# Patient Record
Sex: FEMALE | Race: WHITE | NOT HISPANIC OR LATINO | Employment: UNEMPLOYED | URBAN - METROPOLITAN AREA
[De-identification: names, ages, dates, MRNs, and addresses within clinical notes are randomized per-mention and may not be internally consistent; named-entity substitution may affect disease eponyms.]

---

## 2019-08-20 NOTE — PROGRESS NOTES
[facility]  PATIENT:  Kristen Bee  MRN:  [id number]  :  1970  DATE OF SERVICE:  2019      Assessment/Plan:          Problem List Items Addressed This Visit        Cardiovascular and Mediastinum    Migraine with aura, with intractable migraine, so stated, with status migrainosus    Relevant Medications    rizatriptan (MAXALT) 10 MG tablet    Other Relevant Orders    Vitamin B12    MRI brain with and without contrast    Headache, chronic migraine without aura - Primary    Relevant Medications    cyproheptadine (PERIACTIN) 4 mg tablet    prochlorperazine (COMPAZINE) 10 mg tablet    rizatriptan (MAXALT) 10 MG tablet    magnesium oxide (MAG-OX) 400 mg    Riboflavin (VITAMIN B-2) 100 MG TABS    Other Relevant Orders    Vitamin B12    BUN    Creatinine, serum       Nervous and Auditory    Chronic tension headache    Relevant Medications    rizatriptan (MAXALT) 10 MG tablet       Other    Dizziness and giddiness    Relevant Orders    Ambulatory referral to Physical Therapy      Other Visit Diagnoses     Vitamin D deficiency        Relevant Orders    Vitamin D 25 hydroxy        Low vitamin-D:  [id number] taking vitamin-D 2000 international units on daily basis     Preventive therapy for headaches:   -Over-the-counter supplements: to decrease intensity and frequency of migraines  - Magnesium Oxide 400mg a day  If any diarrhea or upset stomach, decrease dose  as tolerated  - Vitamin B2 200 mg twice a day  May cause the urine to turn yellow which is normal for B 2 to do and is not a sign that you are dehydrated  - cyproheptadine 4 mg at bedtime daily  - did talk to patient about use of CBD  I told her that we do not recommended it, however if she wants to use it she would have to go to [facility] to look for physician who would prescribe it for her    Abortive therapy for headaches:   - at the onset of her migraine headache patient is to take Maxalt 10 mg and Compazine 10 mg  If this fails patient is to call for either Decadron or olanzapine    PLEASE DO NOT GIVE FIORICET TO PATIENT FOR HER HEADACHES OR ANY OPIOIDS    [place] Prescription Drug Monitoring Program report was reviewed and was appropriate   08/21/2019  1  07/25/2019  LORAZEPAM 1 MG TABLET  30 0  30  [facility]  [id number]  PENNS ([id number])  1   [place]    07/25/2019  1  07/25/2019  JMFNIS-[id number]-QAIJ -40  30 0  5  MO [facility]  [id number]  [place] ([id number])  0   [place]    07/25/2019  1  07/25/2019  LORAZEPAM 1 MG TABLET  30 0  30  [facility]  [id number]  [place] ([id number])  0   [place]    06/03/2019  1  06/03/2019  ACETAMINOPHEN-COD #3 TABLET  35 0  14  BR [facility]  [id number]  [place] ([id number])  0  11 25 MME  [place]    05/24/2019  1  05/22/2019  OXYCODONE-ACETAMINOPHEN 5-325  20 0  10  [facility]  [id number]  [place]NS ([id number])  0  15 0 MME  [place]    05/10/2019  1  05/10/2019  OXYCODONE-ACETAMINOPHEN 5-325  30 0  10  AR RIMA  [id number]  [place]ENNS ([id number])  0  22 5 MME  [place]    05/10/2019  1  05/09/2019  ALPRAZOLAM 0 25 MG TABLET  30 0  30  MO Y[facility]  [id number]  [place] ([id number])  0   [place]    05/07/2019  1  05/07/2019  KQVFQF-[id number]-SRCS -40  30 0  8  MO YUM[facility]  [id number]  [place] ([id number])  0   [place]    04/29/2019  1  04/29/2019  OXYCODONE-ACETAMINOPHEN 5-325  30 0  10  BR [facility]  [id number]  PENNS ([id number])  0  22 5 MME  [place]    04/15/2019  1  04/15/2019  OXYCODONE-ACETAMINOPHEN 5-325  30 0  10  [facility]  [id number]  PENNS ([id number])  0  22 5 MME  [place]    04/09/2019  1  04/09/2019  ALPRAZOLAM 0 25 MG TABLET  30 0  30  MO YUMIKO  [id number]  [place]ENNS ([id number])  0   [place]    04/05/2019  1  04/05/2019  OXYCODONE-ACETAMINOPHEN 5-325  30 0  7  AR [facility]IMA  [id number]  PENNS ([id number])  0  32 14 MME  [place]    03/26/2019  1  03/26/2019  OXYCODONE-ACETAMINOPHEN 5-325  35 0  8  [facility]  [id number]  PENNS ([id number])  0  32 81 MME  [place]    03/14/2019  1  03/14/2019  OXYCODONE-ACETAMINOPHEN 5-325  40 0  10  [facility]  [id number]  PENNS ([id number])  0  30 0 MME  [place]    03/07/2019  1  03/07/2019  OXYCODONE-ACETAMINOPHEN 5-325  40 0  7  [facility]  [id number]  [place]ENNS ([id number])  0  42 86 MME  [place]    Filled  ID  Written  Drug  QTY  Days  Prescriber  Rx #  Pharmacy *  Refills  Daily Dose  Pymt Type     *Pharmacy is created using a combination of pharmacy name and the last four digits of the pharmacy license number  Work up:   -  MRI of the head with and without contrast      Headache management instructions  - When patient has a moderate to severe headache, they should seek rest, initiate relaxation and apply cold compresses to the head  - Maintain regular sleep schedule  Adults need at least 7-8 hours of uninterrupted a night  - Limit over the counter medications such as Tylenol, Ibuprofen, Aleve, Excedrin  (No more than 2- 3 times a week or max 10 a month)  - Maintain headache diary  Free BENJIE for a smart phone, which can be used is "Migraine jones"  - Limit caffeine to 1-2 cups 8 to 16 oz a day or less  - Avoid dietary trigger  (aged cheese, peanuts, MSG, aspartame and nitrates)  - Patient is to have regular frequent meals to prevent headache onset  - Please drink at least 64 ounces of water a day to help remain hydrated  Medication overuse headaches:   - Medication overuse headache [facility]) and analgesic overuse can negate the effectiveness of headache preventive measures  Avoid medications with narcotics, barbiturates, or caffeine in them as these can cause rebound headaches after very few doses and can interfere with other headache medicine efficacy  Taking  any acute/abortive over the counter medication or prescription drugs for more than 2-3 days a week can cause medication overuse headache  Importance of Healthy Sleep:  Behavioral sleep changes can promote restful, regular sleep and reduce headache   Simple changes like establishing consistent sleep and wake-up times, as well as getting between 7 and 8 hours of sleep a day, can make a world of difference  Experts also recommend avoiding substances that impair sleep, like caffeine, nicotine and alcohol, and also suggest winding down before bed to prevent sleep problems  To read more go to https://[employer]  org/resource-library/sleep/    Exercising for migraineurs:  Regular exercise can reduce the frequency and intensity of headaches and migraines  When one exercises, the body releases endorphins, which are the bodys natural painkillers  Exercise reduces stress and helps individuals to sleep at night  Exercising at least 30 to 40 minutes 3 times a week is sufficient for most patients  When exercising, follow this plan to prevent headaches:  - First, stay hydrated before, during, and after exercise  - Second part of the exercise plan is to eat sufficient food about an hour and a half before you exercise  Exercise causes ones blood sugar level to decrease, and it is important to have a source of energy    - Final part of the exercise plan is to warm-up  Do not jump into sudden, vigorous exercise if that triggers a headache or migraine  To read more go to https://[employer]  org/resource-library/effects-of-exercise-on-headaches-and-migraines/     Please call with any questions or concerns  Office number is [phone]          History of Present Illness: We had the pleasure of evaluating Stu Leija in neurological consultation today for headaches  As you know,  she is a 51 y o  right handed  female  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 none  Any family history of aneurysms? none    Have you seen someone else for headaches or pain? PCP    Headaches started at what age? In her teen    What is your current pain level? 4/10    How often do the headaches occur? Mild headaches: daily  Moderate to severe headaches: one a day she gets a severe headache    Are you ever headache free? no    Aura/Warning and how long does it last? Yes, she has spots in her vision that last for 20 minutes and tends to occur 1-2 times every few month    What time of the day do the headaches start? Mild headaches: there all the time  Moderate to severe headaches: anytime of the day    How long do the headaches last?   Mild headaches: There all the time  Moderate to severe headaches: few hours to few days    Describe your usual headache? Mild headaches: pressure, achy  Moderate to severe headaches: throbbing, , shooting, stabbing    Where is your headache located? Mild headaches: occipital region and radiates to the frontal region  Moderate to severe headaches: temporal region and frontal at time    What is the intensity of pain?    Mild headaches: 3-4/10  Moderate to severe headaches: varies from 7 to 10/10    Associated symptoms:   - Nausea      Vomiting        Diarrhea  - Photophobia     Phonophobia      Osmophobia  - Insomnia  - Lacrimation  Nasal congestion/rhinorrhea   Pale face  - Stiff or sore neck   - Dizziness   light headed  - Problems with concentration  - Tinnitus   - Worse with lying down   better with lying down  - Prefer to be in a cool, quiet, dark room    Number of days missed per month because of headaches:  Work (or school) days:   Social or Family activities: 80% of them    Headache are worse if the patient: cough  Headache triggers:  Stress, coughing, weather changes, sunlight fatigue  What time of the year do headaches occur more frequently? Usually worse with seasonal change    Have you had trigger point injection performed and how often? No  Have you had Botox injection performed and how often? No   Have you had epidural injections or transforaminal injections performed? No    Alternative therapies used in the past for headaches? None  Have you used CBD or THC for your headaches and how often? No  How many caffeine products to drink a day? One cup a day or soda twice a day  How much water to drink a day? 5 water bottles    Are you current pregnant or planning on getting pregnant? No    What medications do you take or have you taken for your headaches? Preventive therapy:   - vitamin-D, B12,  - Ativan 1 mg, Wellbutrin 300 mg daily, hydroxyzine seen 25 mg, Xanax 0 25 mg daily, lorazepam 1 mg daily, Zoloft 50 mg daily  - cyproheptadine  Abortive Therapy:   - Fioricet 4 times a day 30 tabs, Percocet 1 tab daily,  - Benadryl 25 mg,  - Toradol 10 mg p r n ,           Mood:   Depression: Yes  Anxiety: Yes    Have you ever had any Brain imaging? Yes  - Reviewed old notes from physician seen in the past  - Reviewed images with the patient on Portal   Recent laboratory data was reviewed  Medications and allergies were reviewed  CT head without contrast:  May 2017:  Impression:  1  No evidence of acute intracranial hemorrhage, mass lesion, or CT evidence of acute infarct  2  Trace left mastoid fluid  History reviewed  No pertinent past medical history      Patient Active Problem List   Diagnosis    Migraine with aura, with intractable migraine, so stated, with status migrainosus    Dizziness and giddiness    Headache, chronic migraine without aura    Chronic tension headache       Medications:      Current Outpatient Medications   Medication Sig Dispense Refill    albuterol (PROVENTIL HFA,VENTOLIN HFA) 90 mcg/act inhaler Inhale 2 puffs every 6 (six) hours as needed      buPROPion (WELLBUTRIN XL) 300 mg 24 hr tablet Take 300 mg by mouth      ergocalciferol (VITAMIN D2) 50,000 units       hydrOXYzine HCL (ATARAX) 25 mg tablet Take 25 mg by mouth every 8 (eight) hours as needed      LORazepam (ATIVAN) 1 mg tablet 1 tab daily as needed      montelukast (SINGULAIR) 10 mg tablet Take 10 mg by mouth      omeprazole (PriLOSEC) 40 MG capsule Take 40 mg by mouth daily      cyproheptadine (PERIACTIN) 4 mg tablet 1 at bedtime daily 30 tablet 3    magnesium oxide (MAG-OX) 400 mg Take 1 tablet (400 mg total) by mouth 2 (two) times a day 60 tablet 3    prochlorperazine (COMPAZINE) 10 mg tablet 1 tab at onset of migraine, can repeat in 8 hours, can take with triptan/NSAID 20 tablet 3    Riboflavin (VITAMIN B-2) 100 MG TABS 2 in the morning and 2 at bedtime 120 each 6    rizatriptan (MAXALT) 10 MG tablet Take 1 tablet (10 mg total) by mouth once as needed for migraine May repeat every 2 hours if needed, max 30mg/24 hours 12 tablet 3     No current facility-administered medications for this visit  Allergies: Allergies   Allergen Reactions    Other Swelling and Sneezing     Seasonal    Penicillins Hives       Family History:     History reviewed  No pertinent family history      Social History:     Social History     Socioeconomic History    Marital status:      Spouse name: Not on file    Number of children: Not on file    Years of education: Not on file    Highest education level: Not on file   Occupational History    Not on file   Social Needs    Financial resource strain: Not on file    Food insecurity:     Worry: Not on file     Inability: Not on file    Transportation needs:     Medical: Not on file     Non-medical: Not on file   Tobacco Use    Smoking status: Current Every Day Smoker     Packs/day: 1 00     Types: Cigarettes    Smokeless tobacco: Never Used Substance and Sexual Activity    Alcohol use: Yes     Frequency: Monthly or less     Drinks per session: 1 or 2     Binge frequency: Less than monthly    Drug use: Not Currently    Sexual activity: Not on file   Lifestyle    Physical activity:     Days per week: Not on file     Minutes per session: Not on file    Stress: Not on file   Relationships    Social connections:     Talks on phone: Not on file     Gets together: Not on file     Attends [employer] service: Not on file     Active member of club or organization: Not on file     Attends meetings of clubs or organizations: Not on file     Relationship status: Not on file    Intimate partner violence:     Fear of current or ex partner: Not on file     Emotionally abused: Not on file     Physically abused: Not on file     Forced sexual activity: Not on file   Other Topics Concern    Not on file   Social History Narrative    Not on file         Objective:   Physical Exam:                                                                   Vitals:               /82 (BP Location: Right arm, Patient Position: Sitting, Cuff Size: Adult)   Pulse (!) 110   Resp 16   Ht 5' 5" (1 651 m)   Wt 79 2 kg (174 lb 11 2 oz)   BMI 29 07 kg/m²   BP Readings from Last 3 Encounters:   08/22/19 112/82     Pulse Readings from Last 3 Encounters:   08/22/19 (!) 110                  CONSTITUTIONAL: Strong order of cigarettes  Eyes:  PERRLA, EOM normal      Neck:  Normal ROM, neck supple  HEENT:  Normocephalic atraumatic  No meningismus  Oropharynx is clear and moist  No oral mucosal lesions  Chest:  Respirations regular and unlabored  Cardiovascular:  Distal extremities warm without palpable edema or tenderness, no observed significant swelling  Musculoskeletal:  Full range of motion      Skin:  warm and dry   Psychiatric:  Normal behavior and appropriate affect        Neurological Examination:   Mental status/cognitive function: Orientated to time, place and person  Cranial Nerves: 2 to 12 intact  Motor Exam:    5/5 upper extremity  5/5 lower extremity-limited exam in the right lower extremity  Sensory: grossly intact light touch in all extremities  Reflexes:   2/4 upper extremity  2/4/4 lower extremity, did not checked right lower knee and ankle due to brace  No clonus noted  Coordination:  Past-pointing bilaterally  Gait:  Wearing a brace in the right knee does limping when walking         Review of Systems:   Review of Systems  Review of Systems   Constitutional: Positive for appetite change and fatigue  Negative for fever  HENT: Positive for tinnitus and trouble swallowing  Negative for hearing loss and voice change  Eyes: Positive for visual disturbance (Blurry vision)  Negative for photophobia and pain  Respiratory: Negative  Negative for shortness of breath  Cardiovascular: Negative  Negative for palpitations  Gastrointestinal: Positive for nausea and vomiting  Endocrine: Negative  Negative for cold intolerance and heat intolerance  Hot flashes   Genitourinary: Negative  Negative for dysuria, frequency and urgency  Musculoskeletal: Positive for gait problem (Balance issues), myalgias, neck pain and neck stiffness  Skin: Negative  Negative for rash  Allergic/Immunologic: Negative  Neurological: Positive for dizziness, light-headedness and headaches  Negative for tremors, seizures, syncope, facial asymmetry, speech difficulty, weakness and numbness  Focus issues and forgetfulness   Hematological: Negative  Does not bruise/bleed easily  Psychiatric/Behavioral: Positive for sleep disturbance (difficulty sleeping, cant stay asleep)  Negative for confusion and hallucinations       I personally reviewed and updated the ROS that was entered by the medical assistant       I have spent 60 minutes with Patient  today in which greater than 50% of this time was spent in counseling/coordination of care regarding Prognosis, Risks and benefits of tx options, Intructions for management, Patient and family education, Importance of tx compliance, Risk factor reductions and Impressions        Author:  Kaylyn Agee MD 8/22/2019 2:50 PM

## 2019-08-22 ENCOUNTER — CONSULT (OUTPATIENT)
Dept: NEUROLOGY | Facility: CLINIC | Age: 49
End: 2019-08-22
Payer: COMMERCIAL

## 2019-08-22 ENCOUNTER — APPOINTMENT (OUTPATIENT)
Dept: LAB | Facility: CLINIC | Age: 49
End: 2019-08-22
Payer: COMMERCIAL

## 2019-08-22 ENCOUNTER — TRANSCRIBE ORDERS (OUTPATIENT)
Dept: NEUROLOGY | Facility: CLINIC | Age: 49
End: 2019-08-22

## 2019-08-22 VITALS
BODY MASS INDEX: 29.11 KG/M2 | HEART RATE: 110 BPM | DIASTOLIC BLOOD PRESSURE: 82 MMHG | WEIGHT: 174.7 LBS | HEIGHT: 65 IN | RESPIRATION RATE: 16 BRPM | SYSTOLIC BLOOD PRESSURE: 112 MMHG

## 2019-08-22 DIAGNOSIS — G43.701 CHRONIC MIGRAINE WITHOUT AURA WITH STATUS MIGRAINOSUS, NOT INTRACTABLE: ICD-10-CM

## 2019-08-22 DIAGNOSIS — G43.701 CHRONIC MIGRAINE WITHOUT AURA WITH STATUS MIGRAINOSUS, NOT INTRACTABLE: Primary | ICD-10-CM

## 2019-08-22 DIAGNOSIS — E55.9 VITAMIN D DEFICIENCY: ICD-10-CM

## 2019-08-22 DIAGNOSIS — R42 DIZZINESS AND GIDDINESS: ICD-10-CM

## 2019-08-22 DIAGNOSIS — G43.111 MIGRAINE WITH AURA, WITH INTRACTABLE MIGRAINE, SO STATED, WITH STATUS MIGRAINOSUS: ICD-10-CM

## 2019-08-22 DIAGNOSIS — G44.221 CHRONIC TENSION-TYPE HEADACHE, INTRACTABLE: ICD-10-CM

## 2019-08-22 DIAGNOSIS — G43.111 MIGRAINE WITH AURA, WITH INTRACTABLE MIGRAINE, SO STATED, WITH STATUS MIGRAINOSUS: Primary | ICD-10-CM

## 2019-08-22 PROBLEM — G44.229 CHRONIC TENSION HEADACHE: Status: ACTIVE | Noted: 2019-08-22

## 2019-08-22 PROBLEM — G43.709 HEADACHE, CHRONIC MIGRAINE WITHOUT AURA: Status: ACTIVE | Noted: 2019-08-22

## 2019-08-22 LAB
25(OH)D3 SERPL-MCNC: 15.4 NG/ML (ref 30–100)
BUN SERPL-MCNC: 17 MG/DL (ref 5–25)
CREAT SERPL-MCNC: 0.91 MG/DL (ref 0.6–1.3)
GFR SERPL CREATININE-BSD FRML MDRD: 75 ML/MIN/1.73SQ M
VIT B12 SERPL-MCNC: 518 PG/ML (ref 100–900)

## 2019-08-22 PROCEDURE — 99245 OFF/OP CONSLTJ NEW/EST HI 55: CPT | Performed by: PSYCHIATRY & NEUROLOGY

## 2019-08-22 PROCEDURE — 82306 VITAMIN D 25 HYDROXY: CPT

## 2019-08-22 PROCEDURE — 82607 VITAMIN B-12: CPT

## 2019-08-22 PROCEDURE — 84520 ASSAY OF UREA NITROGEN: CPT

## 2019-08-22 PROCEDURE — 82565 ASSAY OF CREATININE: CPT

## 2019-08-22 PROCEDURE — 36415 COLL VENOUS BLD VENIPUNCTURE: CPT

## 2019-08-22 RX ORDER — HYDROXYZINE HYDROCHLORIDE 25 MG/1
25 TABLET, FILM COATED ORAL EVERY 8 HOURS PRN
COMMUNITY
Start: 2019-06-03

## 2019-08-22 RX ORDER — CYPROHEPTADINE HYDROCHLORIDE 4 MG/1
TABLET ORAL
Qty: 30 TABLET | Refills: 3 | Status: SHIPPED | OUTPATIENT
Start: 2019-08-22 | End: 2021-06-15 | Stop reason: SDDI

## 2019-08-22 RX ORDER — LORAZEPAM 1 MG/1
TABLET ORAL 2 TIMES DAILY
COMMUNITY
Start: 2019-07-25

## 2019-08-22 RX ORDER — ERGOCALCIFEROL 1.25 MG/1
50000 CAPSULE ORAL WEEKLY
COMMUNITY
Start: 2019-08-21

## 2019-08-22 RX ORDER — RIZATRIPTAN BENZOATE 10 MG/1
10 TABLET ORAL ONCE AS NEEDED
Qty: 12 TABLET | Refills: 3 | Status: SHIPPED | OUTPATIENT
Start: 2019-08-22 | End: 2021-09-09 | Stop reason: SDUPTHER

## 2019-08-22 RX ORDER — BUTALBITAL, ACETAMINOPHEN AND CAFFEINE 50; 325; 40 MG/1; MG/1; MG/1
TABLET ORAL
COMMUNITY
Start: 2019-07-25 | End: 2019-08-22 | Stop reason: ALTCHOICE

## 2019-08-22 RX ORDER — OMEPRAZOLE 40 MG/1
40 CAPSULE, DELAYED RELEASE ORAL DAILY
COMMUNITY
Start: 2019-07-25 | End: 2020-07-24

## 2019-08-22 RX ORDER — ALBUTEROL SULFATE 90 UG/1
2 AEROSOL, METERED RESPIRATORY (INHALATION) EVERY 6 HOURS PRN
COMMUNITY
Start: 2019-06-06 | End: 2020-06-05

## 2019-08-22 RX ORDER — MONTELUKAST SODIUM 10 MG/1
10-20 TABLET ORAL DAILY
COMMUNITY
Start: 2019-05-07 | End: 2022-06-24

## 2019-08-22 RX ORDER — PROCHLORPERAZINE MALEATE 10 MG
TABLET ORAL
Qty: 20 TABLET | Refills: 3 | Status: SHIPPED | OUTPATIENT
Start: 2019-08-22 | End: 2022-06-24 | Stop reason: SDUPTHER

## 2019-08-22 RX ORDER — BUPROPION HYDROCHLORIDE 300 MG/1
300 TABLET ORAL DAILY
COMMUNITY
Start: 2019-07-25 | End: 2022-06-24

## 2019-08-22 NOTE — PATIENT INSTRUCTIONS
Low vitamin-D:  04/09/2000 19-9-     Preventive therapy for headaches:   -Over-the-counter supplements: to decrease intensity and frequency of migraines  - Magnesium Oxide 400mg a day  If any diarrhea or upset stomach, decrease dose  as tolerated  - Vitamin B2 200 mg twice a day  May cause the urine to turn yellow which is normal for B 2 to do and is not a sign that you are dehydrated  - cyproheptadine 4 mg at bedtime daily  Abortive therapy for headaches:   - at the onset of her migraine headache patient is to take Maxalt 10 mg and Compazine 10 mg  If this fails patient is to call for either Decadron or olanzapine  [place] Prescription Drug Monitoring Program report was reviewed and was appropriate     Work up:   -  MRI of the head with and without contrast      Headache management instructions  - When patient has a moderate to severe headache, they should seek rest, initiate relaxation and apply cold compresses to the head  - Maintain regular sleep schedule  Adults need at least 7-8 hours of uninterrupted a night  - Limit over the counter medications such as Tylenol, Ibuprofen, Aleve, Excedrin  (No more than 2- 3 times a week or max 10 a month)  - Maintain headache diary  Free BENJIE for a smart phone, which can be used is "Migraine jones"  - Limit caffeine to 1-2 cups 8 to 16 oz a day or less  - Avoid dietary trigger  (aged cheese, peanuts, MSG, aspartame and nitrates)  - Patient is to have regular frequent meals to prevent headache onset  - Please drink at least 64 ounces of water a day to help remain hydrated  Medication overuse headaches:   - Medication overuse headache [facility]) and analgesic overuse can negate the effectiveness of headache preventive measures  Avoid medications with narcotics, barbiturates, or caffeine in them as these can cause rebound headaches after very few doses and can interfere with other headache medicine efficacy   Taking  any acute/abortive over the counter medication or prescription drugs for more than 2-3 days a week can cause medication overuse headache  Importance of Healthy Sleep:  Behavioral sleep changes can promote restful, regular sleep and reduce headache  Simple changes like establishing consistent sleep and wake-up times, as well as getting between 7 and 8 hours of sleep a day, can make a world of difference  Experts also recommend avoiding substances that impair sleep, like caffeine, nicotine and alcohol, and also suggest winding down before bed to prevent sleep problems  To read more go to https://[employer]  org/resource-library/sleep/    Exercising for migraineurs:  Regular exercise can reduce the frequency and intensity of headaches and migraines  When one exercises, the body releases endorphins, which are the bodys natural painkillers  Exercise reduces stress and helps individuals to sleep at night  Exercising at least 30 to 40 minutes 3 times a week is sufficient for most patients  When exercising, follow this plan to prevent headaches:  - First, stay hydrated before, during, and after exercise  - Second part of the exercise plan is to eat sufficient food about an hour and a half before you exercise  Exercise causes ones blood sugar level to decrease, and it is important to have a source of energy    - Final part of the exercise plan is to warm-up  Do not jump into sudden, vigorous exercise if that triggers a headache or migraine  To read more go to https://[employer]  org/resource-library/effects-of-exercise-on-headaches-and-migraines/     Please call with any questions or concerns   Office number is [phone]

## 2019-08-22 NOTE — PROGRESS NOTES
Review of Systems   Constitutional: Positive for appetite change and fatigue  Negative for fever  HENT: Positive for tinnitus and trouble swallowing  Negative for hearing loss and voice change  Eyes: Positive for visual disturbance (Blurry vision)  Negative for photophobia and pain  Respiratory: Negative  Negative for shortness of breath  Cardiovascular: Negative  Negative for palpitations  Gastrointestinal: Positive for nausea and vomiting  Endocrine: Negative  Negative for cold intolerance and heat intolerance  Hot flashes   Genitourinary: Negative  Negative for dysuria, frequency and urgency  Musculoskeletal: Positive for gait problem (Balance issues), myalgias, neck pain and neck stiffness  Skin: Negative  Negative for rash  Allergic/Immunologic: Negative  Neurological: Positive for dizziness, light-headedness and headaches  Negative for tremors, seizures, syncope, facial asymmetry, speech difficulty, weakness and numbness  Focus issues and forgetfulness   Hematological: Negative  Does not bruise/bleed easily  Psychiatric/Behavioral: Positive for sleep disturbance (difficulty sleeping, cant stay asleep)  Negative for confusion and hallucinations

## 2019-08-27 ENCOUNTER — EVALUATION (OUTPATIENT)
Dept: PHYSICAL THERAPY | Facility: CLINIC | Age: 49
End: 2019-08-27
Payer: COMMERCIAL

## 2019-08-27 DIAGNOSIS — R42 DIZZINESS AND GIDDINESS: Primary | ICD-10-CM

## 2019-08-27 PROCEDURE — 97162 PT EVAL MOD COMPLEX 30 MIN: CPT | Performed by: PHYSICAL THERAPIST

## 2019-08-30 ENCOUNTER — OFFICE VISIT (OUTPATIENT)
Dept: PHYSICAL THERAPY | Facility: CLINIC | Age: 49
End: 2019-08-30
Payer: COMMERCIAL

## 2019-08-30 DIAGNOSIS — R42 DIZZINESS AND GIDDINESS: Primary | ICD-10-CM

## 2019-08-30 PROCEDURE — 97110 THERAPEUTIC EXERCISES: CPT | Performed by: PHYSICAL THERAPIST

## 2019-08-30 PROCEDURE — 97112 NEUROMUSCULAR REEDUCATION: CPT | Performed by: PHYSICAL THERAPIST

## 2019-08-30 NOTE — PROGRESS NOTES
Daily Note     Today's date: 2019  Patient name: Stu Leija  : 1970  MRN: [id number]  Referring provider: Maninder Huddleston MD  Dx:   Encounter Diagnosis     ICD-10-CM    1  Dizziness and giddiness R42                   Subjective: Pt has 8/10 HA today but no dizziness  Had hard time getting stuff done due to migraine today  Objective: See treatment diary below      Assessment: Initiated POC which pt tolerated well  Reviewed HEP which pt reported understanding  Pt became tearful during session due to being concerned about her condition since she has to get an MRI  Most difficulty with smooth pursuits and pt had some c/o dizziness  Overall very minimal c/o dizziness this session  Patient would benefit from continued PT      Plan: Progress treatment as tolerated         Short Term Goal Expiration Date:(19)  Long Term Goal Expiration Date: (10/14/19)  POC Expiration Date: (10/14/19)        Precautions NA        Manual                                                                                          Exercise Diary              UT S 30" x 3           LS S 30" x 2           VOR x 1  45" x 2 standing plain            Ambulation with HT/HN   2 laps ea fw/bw           Tandem stance             Tandem gait   2 laps            Ambulation with turns              Smooth pursuits   CW/CCW 45" x 2                                                                                                                                                                                         Modalities

## 2019-09-03 ENCOUNTER — OFFICE VISIT (OUTPATIENT)
Dept: PHYSICAL THERAPY | Facility: CLINIC | Age: 49
End: 2019-09-03
Payer: COMMERCIAL

## 2019-09-03 DIAGNOSIS — R42 DIZZINESS AND GIDDINESS: Primary | ICD-10-CM

## 2019-09-03 PROCEDURE — 97112 NEUROMUSCULAR REEDUCATION: CPT | Performed by: PHYSICAL THERAPIST

## 2019-09-03 PROCEDURE — 97150 GROUP THERAPEUTIC PROCEDURES: CPT | Performed by: PHYSICAL THERAPIST

## 2019-09-05 ENCOUNTER — OFFICE VISIT (OUTPATIENT)
Dept: PHYSICAL THERAPY | Facility: CLINIC | Age: 49
End: 2019-09-05
Payer: COMMERCIAL

## 2019-09-05 DIAGNOSIS — R42 DIZZINESS AND GIDDINESS: Primary | ICD-10-CM

## 2019-09-05 PROCEDURE — 97112 NEUROMUSCULAR REEDUCATION: CPT | Performed by: PHYSICAL THERAPIST

## 2019-09-05 NOTE — PROGRESS NOTES
Daily Note     Today's date: 2019  Patient name: Stu Leija  : 1970  MRN: [id number]  Referring provider: Maninder Huddleston MD  Dx:   Encounter Diagnosis     ICD-10-CM    1  Dizziness and giddiness R42                   Subjective: No complaints upon arrival   No dizziness upon arrival   Reports some compliance with HEP  Objective: See treatment diary below    Assessment: Added VORcx and foam activities today with pt having difficulty due to increasing dizziness and decreased balance  Able to complete with occasional UE support for foam activities  Difficulty with tandem gait in valdes with arms crossed requiring stepping strategies to correct LOBs, however with increased reps improved  Plan: Continue with additions from today progressing potentially to busy background NV as pt able       Short Term Goal Expiration Date:(19)  Long Term Goal Expiration Date: (10/14/19)  POC Expiration Date: (10/14/19)        Precautions NA        Manual                                                                                          Exercise Diary   09/3  9/5       UT S 30" x 3  30"x2  HEP       LS S 30" x 2  30"x2  HEP       VOR x 1  45" x 2 standing plain   standing plain   H45"  V 45" standing plain   H45" x2  V 45" x2       Ambulation with HT/HN   2 laps ea fw/bw     2 laps ea fw/bw  2 laps ea fw/bw       Tandem stance      semitandem foam EC 4x30s       Tandem gait   2 laps   2 laps   in valdes  EO  2x40'       Ambulation with turns     1/2 turns 40ft ea   360  2x40'       Smooth pursuits   CW/CCW 45" x 2   CW/CCW 45" x 2  CW/CCW 45" x 2       VORcx, H/V standing     Plain  2x30s ea       Foam HTs, H/V EC     2x30s ea                                                                                                                                                         Modalities

## 2019-09-06 ENCOUNTER — TELEPHONE (OUTPATIENT)
Dept: NEUROLOGY | Facility: CLINIC | Age: 49
End: 2019-09-06

## 2019-09-06 NOTE — TELEPHONE ENCOUNTER
Pt called in stated that her MRI was denied because she needs documentation that her medications were not working, asking for an appt  Per Thor Child this is correct and she stated that Frieda An could not addend the note  Please advise when you would like to see pt back  Pt wants the MRI

## 2019-09-06 NOTE — TELEPHONE ENCOUNTER
This was the 1st time I saw her  I do not need to see her any sooner than when her appointment is with myself or Bety    Thank you

## 2019-09-09 ENCOUNTER — OFFICE VISIT (OUTPATIENT)
Dept: PHYSICAL THERAPY | Facility: CLINIC | Age: 49
End: 2019-09-09
Payer: COMMERCIAL

## 2019-09-09 DIAGNOSIS — R42 DIZZINESS AND GIDDINESS: Primary | ICD-10-CM

## 2019-09-09 PROCEDURE — 97112 NEUROMUSCULAR REEDUCATION: CPT | Performed by: PHYSICAL THERAPIST

## 2019-09-09 NOTE — PROGRESS NOTES
Daily Note     Today's date: 2019  Patient name: Kelli Perdomo  : 1970  MRN: [id number]  Referring provider: Elia Hi MD  Dx:   Encounter Diagnosis     ICD-10-CM    1  Dizziness and giddiness R42                   Subjective: States that migraines are always worse in the fall every year  MRI was denied  Feels like she is having less energy  Objective: See treatment diary below      Assessment: Tolerated new progressions well, continues to be very challenged with bw ambulation with HT and HN  Pt also had c/o increased HA when pt performed exercises EC  Patient would benefit from continued PT      Plan: Progress treatment as tolerated         Short Term Goal Expiration Date:(19)  Long Term Goal Expiration Date: (10/14/19)  POC Expiration Date: (10/14/19)        Precautions NA        Manual                                                                                          Exercise Diary   8/30  09/3  9/5  9/9     UT S 30" x 3  30"x2  HEP  HEP     LS S 30" x 2  30"x2  HEP  HEP     VOR x 1  45" x 2 standing plain   standing plain   H45"  V 45" standing plain   H45" x2  V 45" x2 standing busy H 45" x 2  V 45" x 2     Ambulation with HT/HN   2 laps ea fw/bw     2 laps ea fw/bw  2 laps ea fw/bw  2 laps ea fw/bw     Tandem stance      semitandem foam EC 4x30s semitandem foam EC 4x30s     Tandem gait   2 laps   2 laps   in valdes  EO  2x40' in valdes  EO  2x40'     Ambulation with turns     1/2 turns 40ft ea   360  2x40' 360  2x40'     Smooth pursuits   CW/CCW 45" x 2   CW/CCW 45" x 2  CW/CCW 45" x 2 CW/CCW, busy 45" x 2     VORcx, H/V standing     Plain  2x30s ea standing busy H 45" x 2  V 45" x 2     Foam HTs, H/V EC     2x30s ea  2x30s ea     Rockerboard         taps EO 20x A/P, M/L                                                                                                                                         Modalities

## 2019-09-10 NOTE — TELEPHONE ENCOUNTER
Unfortunately, need to give time for any medications to work    Therefore needs to be in 2 months after initial visit

## 2019-09-10 NOTE — TELEPHONE ENCOUNTER
Pt called and advised of the below  Pt states that all her migraine meds are not helping  Her insurance is requesting documentation that her meds were not working  States that she really would like to get MRI done asap bec she is planning to quit her job

## 2019-09-12 ENCOUNTER — OFFICE VISIT (OUTPATIENT)
Dept: PHYSICAL THERAPY | Facility: CLINIC | Age: 49
End: 2019-09-12
Payer: COMMERCIAL

## 2019-09-12 DIAGNOSIS — R42 DIZZINESS AND GIDDINESS: Primary | ICD-10-CM

## 2019-09-12 PROCEDURE — 97112 NEUROMUSCULAR REEDUCATION: CPT | Performed by: PHYSICAL THERAPIST

## 2019-09-12 NOTE — PROGRESS NOTES
Daily Note     Today's date: 2019  Patient name: Yolanda Lind  : 1970  MRN: [id number]  Referring provider: Vinicio Currie MD  Dx:   Encounter Diagnosis     ICD-10-CM    1  Dizziness and giddiness R42                   Subjective: Has a migraine which just started  Feels like PT is helping because she feels a little better after doing a few reps of the exercises  States that neurologist does not want to do MRI yet and to trial meds for a few months first  Thinks migraines are worse when it is going to rain and it is suppose to rain today  Objective: See treatment diary below      Assessment: Tolerated treatment well, reported no change in HA while on bike  Pt continues to demo significant instability during ambulation with HT bw and tandem gait  Patient would benefit from continued PT      Plan: Progress treatment as tolerated         Short Term Goal Expiration Date:(19)  Long Term Goal Expiration Date: (10/14/19)  POC Expiration Date: (10/14/19)        Precautions NA        Manual                                                                                          Exercise Diary   8/30  09/3  9/5  9/9  9/12   UT S 30" x 3  30"x2  HEP  HEP     LS S 30" x 2  30"x2  HEP  HEP     VOR x 1  45" x 2 standing plain   standing plain   H45"  V 45" standing plain   H45" x2  V 45" x2 standing busy H 45" x 2  V 45" x 2 standing busy H 45" x 2  V 45" x 2   Ambulation with HT/HN   2 laps ea fw/bw     2 laps ea fw/bw  2 laps ea fw/bw  2 laps ea fw/bw   2 laps ea fw/bw   Tandem stance      semitandem foam EC 4x30s semitandem foam EC 4x30s semitandem foam EC 4x30s   Tandem gait   2 laps   2 laps   in valdes  EO  2x40' in valdes  EO  2x40'  in valdes  EO  2x40'   Ambulation with turns     1/2 turns 40ft ea   360  2x40' 360  2x40' 360  2x40'   Smooth pursuits   CW/CCW 45" x 2   CW/CCW 45" x 2  CW/CCW 45" x 2 CW/CCW, busy 45" x 2     VORcx, H/V standing     Plain  2x30s ea standing busy H 45" x 2  V 45" x 2  standing busy H 45" x 1  V 45" x 1   Foam HTs, H/V EC     2x30s ea  2x30s ea   2x30s ea   Rockerboard         taps EO 20x A/P, M/L taps EO 20x A/P, M/L    Upright bike         L3, 5 min                                                                                                                          Modalities

## 2019-09-16 ENCOUNTER — APPOINTMENT (OUTPATIENT)
Dept: PHYSICAL THERAPY | Facility: CLINIC | Age: 49
End: 2019-09-16
Payer: COMMERCIAL

## 2019-09-19 ENCOUNTER — OFFICE VISIT (OUTPATIENT)
Dept: PHYSICAL THERAPY | Facility: CLINIC | Age: 49
End: 2019-09-19
Payer: COMMERCIAL

## 2019-09-19 DIAGNOSIS — R42 DIZZINESS AND GIDDINESS: Primary | ICD-10-CM

## 2019-09-19 PROCEDURE — 97112 NEUROMUSCULAR REEDUCATION: CPT | Performed by: PHYSICAL THERAPIST

## 2019-09-19 NOTE — PROGRESS NOTES
Progress Note     Today's date: 2019  Patient name: Joseph Fletcher  : 1970  MRN: [id number]  Referring provider: Alicja Jacobs MD  Dx:   Encounter Diagnosis     ICD-10-CM    1  Dizziness and giddiness R42                   Subjective: Still getting migraines but overall better  Dizziness is in the morning  No dizziness or migraine now  Overall does not think migraines are as severe before  Migraines are usually worse with changes in the weather  Dizziness is less throughout the day but the worst in the morning  Denies any neck pain and has 0/10 HA  Pt just accepted a job and will be starting next week  Objective: See treatment diary below      Assessment: Progress update performed this session  Compared to IE, pt reported significant improvement in dizziness per DHI and overall subjective report of improved sxs  Pt also demo improved cervical AROM and static balance per MCTSIB  No significant improvement in dynamic balance per FGA and continues to have instability with tandem gait, ambulation with HT/HN, and BW ambulation  Starting next week, pt will be starting a new job and will not be able to attend PT consistently but pt is independent with HEP and has met most of her goals so pt will be placed on 30 day hold at this time, recommended pt to return to PT if sxs worsens  Performed and reviewed updated HEP this session  Plan: 30 day hold   Planned therapy interventions: balance, neuromuscular re-education, stretching, home exercise program, patient education and postural training      Goals  ST weeks  1  Independent with HEP - MET  2  Improve cervical AROM in all directions 5-10 deg bilaterally - MET    LT-6 weeks  1  FGA > 27/30 - NOT MET  2  MCTSIB = 30 sec in all conditions - MET  3   DHI < 50 - MET       Cervical     Flexion: 45, prev = 40 degrees   Extension: 50, prev = 40 degrees      Left lateral flexion: 42, prev = 32 degrees      Right lateral flexion: 40, prev = 35 degrees      Left rotation: 65, prev = 63 degrees  Right rotation: 78, prev = 61 degrees       Eyes open level surface: 30  Eyes open foam surface: 30  Eyes closed level surface: 30  Eyes closed foam surface: 30, prev 20     FGA: 8/27: 23/30, 9/19: 24/30     DHI: 8/27: 70, 9/19: 22     Short Term Goal Expiration Date:(9/27/19)  Long Term Goal Expiration Date: (10/14/19)  POC Expiration Date: (10/14/19)        Precautions NA        Manual                                                                                          Exercise Diary  9/18       UT S        LS S        VOR x 1 Standing busy H/V 45" x 2       Ambulation with HT/HN  3 laps ea fw only        Tandem stance EO level 30"x 2       Tandem gait  3 laps        Ambulation with turns         Smooth pursuits         VORcx, H/V standing Gait 2 laps       Foam HTs, H/V EC        Rockerboard          Upright bike         FTEC foam 30" x 3        BW ambulation  3 laps                                                                                Modalities

## 2019-09-24 ENCOUNTER — APPOINTMENT (OUTPATIENT)
Dept: PHYSICAL THERAPY | Facility: CLINIC | Age: 49
End: 2019-09-24
Payer: COMMERCIAL

## 2019-09-27 ENCOUNTER — APPOINTMENT (OUTPATIENT)
Dept: PHYSICAL THERAPY | Facility: CLINIC | Age: 49
End: 2019-09-27
Payer: COMMERCIAL

## 2020-12-10 NOTE — PROGRESS NOTES
PT Evaluation     Today's date: 2019  Patient name: Dora Rodrigues  : 1970  MRN: [id number]  Referring provider: Courtney Vargas MD  Dx:   Encounter Diagnosis     ICD-10-CM    1  Dizziness and giddiness R42 Ambulatory referral to Physical Therapy                  Assessment  Assessment details: Pt is a 51year old female referred to OPPT for dizziness with hx of chronic migraines  Pt demo some abnormal oculomotor findings as well as impaired coordination yokasta in L UE  Pt was also symptomatic during vestibular testing, had difficulty with VOR x 1, demo decreased cervical AROM, and demo some balance impairments per MCTSIB and FGA  At this time, pt would benefit from skilled PT 2x/week for 4-6 weeks to manage HA and decrease dizziness  Would also benefit from MRI to rule out central pathology (has one scheduled in September) due to abnormal findings per VNG, oculomotor testing, and abnormal UE coordination  Goals  ST weeks  1  Independent with HEP  2  Improve cervical AROM in all directions 5-10 deg bilaterally     LT-6 weeks  1  FGA > 27/30  2  MCTSIB = 30 sec in all conditions  3  DHI < 50    Plan  Planned therapy interventions: balance, neuromuscular re-education, stretching, home exercise program, patient education and postural training        Subjective Evaluation    History of Present Illness  Mechanism of injury: Pt is a 51year old female referred to OPPT for dizziness  States that she has been having migraines for 20 years  HAs has been getting worse progressively over the years and over the last 2 years she has been having more dizziness with them  States that rain makes HAs worse  Had VNG earlier this month and had central findings so referred to neurologist  Now has MRI scheduled for 19  Migraines and dizziness comes on together  Was going to get CT scan of sinuses but denied by insurance   Currently not working due to knee surgery but remembers she had hard time working because she had to take a lot naps due to her migraines  Also had a hard time remembering things and concentrating  Did not have insurance over last 10 years so was never treated for her migraines before  Only has neck stiffness which pt states is stress related  States that she also had sinus surgery about 10 years ago which helped  Has a lot of swelling in sinuses which pt states runs in her family history  Also has some vision changes which pt report drastic changes over last 2-3 years (nearsightedness)    Work: out of work due to knee surgery, 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  Pt appear to be a poor historian since dates are inconsistent with information from chart review  Pain  Current pain ratin  Location: bilateral temporal (HA)          Objective     Concurrent Complaints  Positive for headaches, nausea/motion sickness, visual change, memory loss, aural fullness, poor concentration and peripheral neuropathy  Negative for tinnitus and hearing loss    Active Range of Motion   Cervical/Thoracic Spine       Cervical    Flexion: 40 degrees   Extension: 40 degrees      Left lateral flexion: 32 degrees      Right lateral flexion: 35 degrees      Left rotation: 63 degrees  Right rotation: 61 degrees           Neuro Exam:     Dizziness  Positive for disequilibrium, vertigo, motion sickness, light-headedness, rocking or swaying and diplopia  Negative for oscillopsia and floating or swimming  Exacerbating factors  Positive for bending over, walking, turning head, supine to/from sitting and optokinetic movement  Negative for rolling in bed, looking up and walking in busy environment  Symptoms   Duration: unknown   Frequency: daily     Headaches   Patient reports headaches: Yes     Frequency: daily, several times  Duration: varies, sometimes days, sometimes 30 min   Location: varies  Exacerbating factors: rain  Relieving factors: nothing     Cervical exam Modified VBI   Left: asymptomatic  Right: asymptomatic  Seated posture: forward head posture    Oculomotor exam   Oculomotor ROM: WNL  Resting nystagmus: not present   Gaze holding nystagmus: not present left  and not present right  Smooth pursuits: saccadic smooth pursuit  Vertical saccades: hypometric  Horizontal saccades: hypometric   Convergence: abnormal (L eye teaming)  Head thrust: left normal and right normal  Dynamic visual acuity: normal  Dynamic head: 20/50  Static head: 20/40    Positional testing   [place]-Hallpike   Left posterior canal: WNL  Right posterior canal: WNL  Roll test   Left horizontal canal: WNL  Right horizontal canal: WNL  Positional testing comment: VOR x 1: abnormal, difficulty keeping eyes on target and dizziness  VOR cx: normal      Balance assessments   MCTSIB   Eyes open level surface: 30  Eyes open foam surface: 30  Eyes closed level surface: 30  Eyes closed foam surface: 20    FGA: 8/27: 23/30    DHI: 8/27: 70    Coordination Left Right   Heel To Chadwick WNL WNL   Finger To Nose Abnormal, dysmetric WNL   Rapid Alternating Movement     UE WNL WNL   LE WNL WNL   Babinski and Hui WNL bilaterally       Short Term Goal Expiration Date:(9/27/19)  Long Term Goal Expiration Date: (10/14/19)  POC Expiration Date: (10/14/19)      Precautions NA       Manual                                                     Exercise Diary         UT S        LS S        VOR x 1        Ambulation with HT/HN         Tandem stance        Tandem gait         Ambulation with turns                                                                                                                     Modalities Dupixent Pregnancy And Lactation Text: This medication likely crosses the placenta but the risk for the fetus is uncertain. This medication is excreted in breast milk.

## 2021-02-23 ENCOUNTER — TELEPHONE (OUTPATIENT)
Dept: NEUROLOGY | Facility: CLINIC | Age: 51
End: 2021-02-23

## 2021-02-23 NOTE — TELEPHONE ENCOUNTER
Called pt to confirm upcoming apt in 2 weeks on 3/9/21 with Dr Zuleyka Scott  Patient states she experiences Dizziness, loss of balance , depth perception    Asked pt if they are unable to keep apt or interested in virtual apt to please call office, also advised of no show fee  Advised we will call closer to day of apt for COVID screening

## 2021-03-09 ENCOUNTER — OFFICE VISIT (OUTPATIENT)
Dept: NEUROLOGY | Facility: CLINIC | Age: 51
End: 2021-03-09
Payer: COMMERCIAL

## 2021-03-09 VITALS
TEMPERATURE: 98 F | DIASTOLIC BLOOD PRESSURE: 69 MMHG | BODY MASS INDEX: 28.89 KG/M2 | WEIGHT: 173.4 LBS | HEIGHT: 65 IN | SYSTOLIC BLOOD PRESSURE: 120 MMHG | HEART RATE: 113 BPM

## 2021-03-09 DIAGNOSIS — G43.701 CHRONIC MIGRAINE WITHOUT AURA WITH STATUS MIGRAINOSUS, NOT INTRACTABLE: Primary | ICD-10-CM

## 2021-03-09 DIAGNOSIS — E55.9 VITAMIN D DEFICIENCY: ICD-10-CM

## 2021-03-09 PROCEDURE — 99214 OFFICE O/P EST MOD 30 MIN: CPT | Performed by: PSYCHIATRY & NEUROLOGY

## 2021-03-09 RX ORDER — MAGNESIUM 200 MG
TABLET ORAL
Qty: 60 TABLET | Refills: 11 | Status: SHIPPED | OUTPATIENT
Start: 2021-03-09 | End: 2021-06-15 | Stop reason: SDUPTHER

## 2021-03-09 RX ORDER — RIZATRIPTAN BENZOATE 10 MG/1
10 TABLET, ORALLY DISINTEGRATING ORAL ONCE AS NEEDED
Qty: 12 TABLET | Refills: 3 | Status: SHIPPED | OUTPATIENT
Start: 2021-03-09 | End: 2021-12-06 | Stop reason: SDUPTHER

## 2021-03-09 RX ORDER — DIVALPROEX SODIUM 250 MG/1
TABLET, DELAYED RELEASE ORAL
Qty: 60 TABLET | Refills: 11 | Status: SHIPPED | OUTPATIENT
Start: 2021-03-09 | End: 2021-06-15 | Stop reason: ALTCHOICE

## 2021-03-09 RX ORDER — BUTALBITAL, ACETAMINOPHEN AND CAFFEINE 50; 325; 40 MG/1; MG/1; MG/1
1 TABLET ORAL AS NEEDED
COMMUNITY
End: 2021-07-07 | Stop reason: SDUPTHER

## 2021-03-09 NOTE — PROGRESS NOTES
[facility]  PATIENT:  Jonatan Harrington  MRN:  [id number]  :  1970  DATE OF SERVICE:  3/9/2021      Assessment/Plan:      Problem List Items Addressed This Visit        Cardiovascular and Mediastinum    Headache, chronic migraine without aura - Primary    Relevant Medications    butalbital-acetaminophen-caffeine (FIORICET,ESGIC) -40 mg per tablet    Riboflavin (Vitamin B-2) 100 MG TABS    Magnesium 200 MG TABS    divalproex sodium (DEPAKOTE) 250 mg EC tablet    rizatriptan (MAXALT-MLT) 10 MG disintegrating tablet    Other Relevant Orders    Comprehensive metabolic panel    MRI brain with and without contrast      Other Visit Diagnoses     Vitamin D deficiency              Low vitamin-D:  [id number] taking vitamin-D 2000 international units on daily basis     Medication overuse headaches: (fioricet used daily and also takes tylenol 500mg 7 tabs or advil cold/sinus)   - Medication overuse headache [facility]) and analgesic overuse can negate the effectiveness of headache preventive measures  Avoid medications with narcotics, barbiturates, or caffeine in them as these can cause rebound headaches after very few doses and can interfere with other headache medicine efficacy  Taking  any acute/abortive over the counter medication or prescription drugs for more than 2-3 days a week can cause medication overuse headache  Preventive therapy for headaches:   -Over-the-counter supplements: to decrease intensity and frequency of migraines  - Magnesium Oxide 400mg a day   If any diarrhea or upset stomach, decrease dose  as tolerated  - Vitamin B2 200 mg twice a day  May cause the urine to turn yellow which is normal for B 2 to do and is not a sign that you are dehydrated     - Depakote 250 mg two at bedtime  Abortive therapy for headaches:   - at the onset of her migraine headache patient is to take Maxalt 10 mg   - goal is to wean off of the  Fioricet slowly taking taking 20 a month and then 10 a month and then 5 a month and then stop  Follow up with neurology every 6 weeks to help pt manage her headaches        PLEASE DO NOT GIVE FIORICET TO PATIENT FOR HER HEADACHES OR ANY OPIOIDS     Fill Date ID   Written Drug Qty Days Prescriber Rx # Pharmacy Refill   Daily Dose* Pymt Type      02/07/2021  1   12/29/2020  Pdcrqk-Sapltozc-Zaog -40  30 00  5 Mo Megan   [id number]   Pen (4896)   2   [place]   02/07/2021  1   01/06/2021  Lorazepam 1 MG Tablet  60 00  30 Mo Megan   [id number]   Pen (4896)   1   [place]   01/27/2021  1   12/29/2020  Dtfotr-Repwhjcr-Uzvh -40  30 00  5 Mo Megan   [id number]   Pen (4896)   1   [place]   01/06/2021  1   01/06/2021  Lorazepam 1 MG Tablet  60 00  30 Mo Megan   [id number]   Pen (4896)   [place]   [place]   12/30/2020  1   12/29/2020  Tsdvmy-Oeartkhy-Ybhw -40  30 00  5 Mo Megan   [id number]   Pen (4896)   [place]   12/07/2020  1   09/02/2020  Mqvnxl-Wethcylp-Bwwf -40  30 00  5 Mo Megan   [id number]   Pen (4896)   [place]   12/07/2020  1   09/02/2020  Lorazepam 1 MG Tablet  60 00  30 Mo Megan   [id number]   Pen (4896)   1   [place]   10/30/2020  1   10/02/2020  Nuyjqk-Lvajuypj-Ofpa -40  30 00  5 Mo Megan   [id number]   Pen (4896)   [place]   [place]   10/30/2020  1   09/02/2020  Lorazepam 1 MG Tablet  60 00  30 Mo Megan   [id number]   Pen (4896)   [place]   08/05/2020  1   08/05/2020  Lorazepam 1 MG Tablet  60 00  30 An Bro   [id number]   Pen (4896)   0   [place]   08/05/2020  1   06/23/2020  Fkdhza-Wnrrmnvm-Hgbb -40  30 00  5 Mo Megan   [id number]   Pen (4896)   2   [place]   07/23/2020  1   06/23/2020  Ksqrom-Fsffkucy-Wuiw -40  30 00  5 Mo Megan   [id number]   Pen (4896)   1   [place]   07/06/2020  1   05/28/2020  Lorazepam 1 MG Tablet  60 00  30 Mo Megan   [id number]   Pen (4896)   1   [place]   06/23/2020  1   06/23/2020  Jxlbht-Nbebcxqe-Rmph -40  30 00  5 Mo Megan   [id number]   Pen (4896)   [place]   06/04/2020  1   05/28/2020  Lorazepam 1 MG Tablet  60 00  30 Mo Megan   [id number]   Pen (4896)   [place]   [place]   06/01/2020  1   06/01/2020  Ooipen-Xflijpio-Wzqk -40  30 00  7 Mo Megan   [id number]   Pen (4896)   0   [place]   05/12/2020  1   05/08/2020  Votzgr-Okackwhr-Ygxk -40  30 00  8 Mo Megan   [id number]   Pen (4896)   0   [place]   04/23/2020  1   03/24/2020  Lorazepam 1 MG Tablet  60 00  30 Mo Megan   [id number]   Pen (4896)   1   [place]   04/06/2020  1   04/06/2020  Lrdazc-Tpgpmrbw-Endx -40  30 00  7 Mo Megan   [id number]   Pen (4896)   0   [place]   03/24/2020  1   03/24/2020  Lorazepam 1 MG Tablet  60 00  30 Mo Megan   [id number]   Pen (4896)   [place]   [place]   03/24/2020  1   03/24/2020  Zqisay-Qyfbldae-Elcv -40  30 00  5 Mo Megan   [id number]   Pen (4896)   [place]   [place]   02/24/2020  1   02/24/2020  Lorazepam 1 MG Tablet  60 00  30 Laureen Brn   [id number]   Pen (4896)   0   [place]   02/21/2020  1   01/28/2020  Mzkahd-Yxwxjqib-Wezh -40  30 00  7 Mo Megan   [id number]   Pen (4896)   2   [place]   02/10/2020  1   01/28/2020  Lqbhax-Gkdvscsu-Corj -40  30 00  7 Mo Megan   [id number]   Pen (4896)   1   [place]   01/28/2020  1   01/28/2020  Lorazepam 1 MG Tablet  60 00  30 Mo Megan   [id number]   Pen (4896)   0               Work up:   -  MRI of the head with and without contrast        Headache management instructions  - When patient has a moderate to severe headache, they should seek rest, initiate relaxation and apply cold compresses to the head  - Maintain regular sleep schedule  Adults need at least 7-8 hours of uninterrupted a night  - Limit over the counter medications such as Tylenol, Ibuprofen, Aleve, Excedrin  (No more than 2- 3 times a week or max 10 a month)  - Maintain headache diary   Free BENJIE for a smart phone, which can be used is "Migraine jones"  - Limit caffeine to 1-2 cups 8 to 16 oz a day or less    - Avoid dietary trigger  (aged cheese, peanuts, MSG, aspartame and nitrates)  - Patient is to have regular frequent meals to prevent headache onset     - Please drink at least 64 ounces of water a day to help remain hydrated              Importance of Healthy Sleep:  Behavioral sleep changes can promote restful, regular sleep and reduce headache  Simple changes like establishing consistent sleep and wake-up times, as well as getting between 7 and 8 hours of sleep a day, can make a world of difference  Experts also recommend avoiding substances that impair sleep, like caffeine, nicotine and alcohol, and also suggest winding down before bed to prevent sleep problems  To read more go to https://[employer]  org/resource-library/sleep/     Exercising for migraineurs:  Regular exercise can reduce the frequency and intensity of headaches and migraines  When one exercises, the body releases endorphins, which are the bodys natural painkillers  Exercise reduces stress and helps individuals to sleep at night  Exercising at least 30 to 40 minutes 3 times a week is sufficient for most patients  When exercising, follow this plan to prevent headaches:  - First, stay hydrated before, during, and after exercise  - Second part of the exercise plan is to eat sufficient food about an hour and a half before you exercise  Exercise causes ones blood sugar level to decrease, and it is important to have a source of energy    - Final part of the exercise plan is to warm-up  Do not jump into sudden, vigorous exercise if that triggers a headache or migraine  To read more go to [email or web address]  org/resource-library/effects-of-exercise-on-headaches-and-migraines/     Please call with any questions or concerns  Office number is [phone]           History of Present Illness:    We had the pleasure of evaluating Kat Rosas in neurological follow up today for headaches   As you know,  she is a 50 y o  right handed  female  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 none  Any family history of aneurysms? none     Have you seen someone else for headaches or pain?  PCP     Headaches started at what age?  In her teen     What is your current pain level?  4/10     How often do the headaches occur? Mild headaches: daily  Moderate to severe headaches: one a day she gets a severe headache     Are you ever headache free?  no     Aura/Warning and how long does it last? Yes, nausea     What time of the day do the headaches start? Mild headaches: there all the time  Moderate to severe headaches: anytime of the day     How long do the headaches last?   Mild headaches:  There all the time  Moderate to severe headaches: few hours to few days     Describe your usual headache? Mild headaches: pressure, achy  Moderate to severe headaches: throbbing, , shooting, stabbing     Where is your headache located? Mild headaches: occipital region and radiates to the frontal region  Moderate to severe headaches: temporal region and frontal at time     What is the intensity of pain?    Mild headaches: 4-5/10  Moderate to severe headaches: varies from 9 to 10/10     Associated symptoms:   - Nausea      Vomiting        Diarrhea  - Photophobia     Phonophobia      Osmophobia  - Insomnia  -  Nasal congestion/rhinorrhea     - Stiff or sore neck   - Dizziness   light headed  - Problems with concentration  - better with lying down  - Prefer to be in a cool, quiet, dark room     Number of days missed per month because of headaches:  Work (or school) days: missed one day last year   Social or Family activities: almost all of them     Headache are worse if the patient: cough, movement  Headache triggers:  Stress, coughing, weather changes, sunlight fatigue, bright lights  What time of the year do headaches occur more frequently? worse in the winter      Have you had trigger point injection performed and how often? No  Have you had Botox injection performed and how often? No   Have you had epidural injections or transforaminal injections performed? No     Alternative therapies used in the past for headaches?  None  Have you used CBD or THC for your headaches and how often? No  How many caffeine products to drink a day? 2 liter soda a day, 1-2 glasses of caffeine a day   How much water to drink a day? Not much     Are you current pregnant or planning on getting pregnant? No     What medications do you take or have you taken for your headaches? Preventive therapy:   - Ativan 1 mg BID, Wellbutrin 300 mg daily,    Abortive Therapy:   - Fioricet 4 times a day 30 tabs: dulls the headache, makes it manageable, but does not get rid of it    -7 to 8 tylenol if the Fioricet does not not help                Have you ever had any Brain imaging? Yes  - Reviewed old notes from physician seen in the past  - Reviewed images with the patient on Portal   Recent laboratory data was reviewed  Medications and allergies were reviewed      CT head without contrast:  May 2017:  Impression:  1  No evidence of acute intracranial hemorrhage, mass lesion, or CT evidence of acute infarct  2  Trace left mastoid fluid          Past Medical History:   Diagnosis Date    Migraine        Patient Active Problem List   Diagnosis    Migraine with aura, with intractable migraine, so stated, with status migrainosus    Dizziness and giddiness    Headache, chronic migraine without aura    Chronic tension headache       Medications:      Current Outpatient Medications   Medication Sig Dispense Refill    buPROPion (WELLBUTRIN XL) 300 mg 24 hr tablet Take 300 mg by mouth daily       butalbital-acetaminophen-caffeine (FIORICET,ESGIC) -40 mg per tablet Take 1 tablet by mouth as needed for headaches      LORazepam (ATIVAN) 1 mg tablet 1 tab daily as needed      montelukast (SINGULAIR) 10 mg tablet Take 10-20 mg by mouth daily       cyproheptadine (PERIACTIN) 4 mg tablet 1 at bedtime daily (Patient not taking: Reported on 3/9/2021) 30 tablet 3    divalproex sodium (DEPAKOTE) 250 mg EC tablet Two at bedtime 60 tablet 11    ergocalciferol (VITAMIN D2) 50,000 units Take 50,000 Units by mouth once a week       hydrOXYzine HCL (ATARAX) 25 mg tablet Take 25 mg by mouth every 8 (eight) hours as needed      Magnesium 200 MG TABS Two in am 60 tablet 11    magnesium oxide (MAG-OX) 400 mg Take 1 tablet (400 mg total) by mouth 2 (two) times a day (Patient not taking: Reported on 3/9/2021) 60 tablet 3    omeprazole (PriLOSEC) 40 MG capsule Take 40 mg by mouth daily      prochlorperazine (COMPAZINE) 10 mg tablet 1 tab at onset of migraine, can repeat in 8 hours, can take with triptan/NSAID (Patient not taking: Reported on 3/9/2021) 20 tablet 3    Riboflavin (VITAMIN B-2) 100 MG TABS 2 in the morning and 2 at bedtime (Patient not taking: Reported on 3/9/2021) 120 each 6    Riboflavin (Vitamin B-2) 100 MG TABS Two in am and two in pm 120 tablet 11    rizatriptan (MAXALT) 10 MG tablet Take 1 tablet (10 mg total) by mouth once as needed for migraine May repeat every 2 hours if needed, max 30mg/24 hours (Patient not taking: Reported on 3/9/2021) 12 tablet 3    rizatriptan (MAXALT-MLT) 10 MG disintegrating tablet Take 1 tablet (10 mg total) by mouth once as needed for migraine May repeat every 2 hours if needed, max 30mg/24 hours 12 tablet 3     No current facility-administered medications for this visit  Allergies:       Allergies   Allergen Reactions    Other Swelling and Sneezing     Seasonal    Penicillins Hives    Sulfamethoxazole-Trimethoprim Itching       Family History:     Family History   Problem Relation Age of Onset   Head Breast cancer Mother     Uterine cancer Mother     Prostate cancer Father        Social History:     Social History     Socioeconomic History    Marital status:      Spouse name: Not on file    Number of children: Not on file    Years of education: Not on file    Highest education level: Not on file   Occupational History    Not on file   Social Needs    Financial resource strain: Not on file    Food insecurity     Worry: Not on file     Inability: Not on file    Transportation needs     Medical: Not on file     Non-medical: Not on file   Tobacco Use    Smoking status: Current Every Day Smoker     Packs/day: 2 00     Types: Cigarettes    Smokeless tobacco: Never Used    Tobacco comment: Sometimes 3 packs a day    Substance and Sexual Activity    Alcohol use: Yes     Comment: Socially     Drug use: Not Currently    Sexual activity: Not on file   Lifestyle    Physical activity     Days per week: Not on file     Minutes per session: Not on file    Stress: Not on file   Relationships    Social connections     Talks on phone: Not on file     Gets together: Not on file     Attends Islam service: Not on file     Active member of club or organization: Not on file     Attends meetings of clubs or organizations: Not on file     Relationship status: Not on file    Intimate partner violence     Fear of current or ex partner: Not on file     Emotionally abused: Not on file     Physically abused: Not on file     Forced sexual activity: Not on file   Other Topics Concern    Not on file   Social History Narrative    Not on file         Objective:   Physical Exam: Vitals:            /69 (BP Location: Left arm, Patient Position: Sitting, Cuff Size: Standard)   Pulse (!) 113   Temp 98 °F (36 7 °C) (Temporal)   Ht 5' 5" (1 651 m)   Wt 78 7 kg (173 lb 6 4 oz)   BMI 28 86 kg/m²   BP Readings from Last 3 Encounters:   03/09/21 120/69   08/22/19 112/82     Pulse Readings from Last 3 Encounters:   03/09/21 (!) 113   08/22/19 (!) 110            CONSTITUTIONAL: Well developed, well nourished, well groomed  No dysmorphic features  Eyes:  PERRLA, EOM normal      Neck:  Normal ROM, neck supple  HEENT:  Normocephalic atraumatic  No meningismus  Oropharynx is clear and moist  No oral mucosal lesions  Chest:  Respirations regular and unlabored  Cardiovascular:  Distal extremities warm without palpable edema or tenderness, no observed significant swelling  Musculoskeletal:  Full range of motion  Skin:  warm and dry   Psychiatric:  Normal behavior and appropriate affect      Neurological Examination:   Mental status/cognitive function: Orientated to time, place and person  Cranial Nerves: 2 to 12 intact    Motor Exam:  Moving all extremities without any difficulty    Sensory:  Intact to light touch throughout    Reflexes:  Not checked today    Coordination: Finger to nose intact bilaterally, no tremor noted    Gait: - Steady casual gait         Review of Systems:   Review of Systems  Review of Systems   Constitutional: Negative  HENT: Positive for trouble swallowing  Eyes: Positive for visual disturbance  Respiratory: Negative  Cardiovascular: Negative  Gastrointestinal: Negative  Endocrine: Negative  Genitourinary: Negative  Musculoskeletal: Negative  Skin: Negative  Allergic/Immunologic: Negative  Neurological: Positive for dizziness and headaches  Hematological: Negative  Psychiatric/Behavioral: The patient is nervous/anxious             I have spent 25 minutes with Patient  today in which greater than 50% of this time was spent in counseling/coordination of care regarding Diagnostic results, Prognosis, Risks and benefits of [place] options,  instructions for management, Patient and family education, Importance of TX compliance, Risk factor reductions, Impressions and Plan of care as above           Author:  Funmilayo Logan MD 3/9/2021 3:05 PM

## 2021-03-09 NOTE — PROGRESS NOTES
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 none  Any family history of aneurysms? none     Have you seen someone else for headaches or pain?  PCP     Headaches started at what age?  In her teen     What is your current pain level?  4/10     How often do the headaches occur? Mild headaches: daily  Moderate to severe headaches: one a day she gets a severe headache     Are you ever headache free?  no     Aura/Warning and how long does it last? Yes, nausea     What time of the day do the headaches start? Mild headaches: there all the time  Moderate to severe headaches: anytime of the day     How long do the headaches last?   Mild headaches:  There all the time  Moderate to severe headaches: few hours to few days     Describe your usual headache? Mild headaches: pressure, achy  Moderate to severe headaches: throbbing, , shooting, stabbing     Where is your headache located? Mild headaches: occipital region and radiates to the frontal region  Moderate to severe headaches: temporal region and frontal at time     What is the intensity of pain?    Mild headaches: 4-5/10  Moderate to severe headaches: varies from 9 to 10/10     Associated symptoms:   - Nausea      Vomiting        Diarrhea  - Photophobia     Phonophobia      Osmophobia  - Insomnia  -  Nasal congestion/rhinorrhea     - Stiff or sore neck   - Dizziness   light headed  - Problems with concentration  - better with lying down  - Prefer to be in a cool, quiet, dark room     Number of days missed per month because of headaches:  Work (or school) days: missed one day last year   Social or Family activities: almost all of them     Headache are worse if the patient: cough, movement  Headache triggers:  Stress, coughing, weather changes, sunlight fatigue, bright lights  What time of the year do headaches occur more frequently? worse in the winter      Have you had trigger point injection performed and how often? No  Have you had Botox injection performed and how often? No   Have you had epidural injections or transforaminal injections performed? No     Alternative therapies used in the past for headaches?  None  Have you used CBD or THC for your headaches and how often? No  How many caffeine products to drink a day? 2 liter soda a day, 1-2 glasses of caffeine a day   How much water to drink a day? Not much     Are you current pregnant or planning on getting pregnant? No     What medications do you take or have you taken for your headaches? Preventive therapy:   - Ativan 1 mg BID, Wellbutrin 300 mg daily,    Abortive Therapy:   - Fioricet 4 times a day 30 tabs: dulls the headache, makes it manageable, but does not get rid of it    -7 to 8 tylenol if the Fioricet does not not help              Mood:   Depression: Yes  Anxiety: Yes    Have you ever had any Brain imaging? Yes  - Reviewed old notes from physician seen in the past  - Reviewed images with the patient on Portal   Recent laboratory data was reviewed  Medications and allergies were reviewed      CT head without contrast:  May 2017:  Impression:  1  No evidence of acute intracranial hemorrhage, mass lesion, or CT evidence of acute infarct  2  Trace left mastoid fluid

## 2021-03-09 NOTE — PROGRESS NOTES
Review of Systems   Constitutional: Negative  HENT: Positive for trouble swallowing  Eyes: Positive for visual disturbance  Respiratory: Negative  Cardiovascular: Negative  Gastrointestinal: Negative  Endocrine: Negative  Genitourinary: Negative  Musculoskeletal: Negative  Skin: Negative  Allergic/Immunologic: Negative  Neurological: Positive for dizziness and headaches  Hematological: Negative  Psychiatric/Behavioral: The patient is nervous/anxious

## 2021-03-09 NOTE — PATIENT INSTRUCTIONS
Low vitamin-D:  04/09/20196308-8-[id number] taking vitamin-D 2000 international units on daily basis     Medication overuse headaches: (fioricet used daily and also takes tylenol 500mg 7 tabs or advil cold/sinus)   - Medication overuse headache [facility]) and analgesic overuse can negate the effectiveness of headache preventive measures  Avoid medications with narcotics, barbiturates, or caffeine in them as these can cause rebound headaches after very few doses and can interfere with other headache medicine efficacy  Taking  any acute/abortive over the counter medication or prescription drugs for more than 2-3 days a week can cause medication overuse headache  Preventive therapy for headaches:   -Over-the-counter supplements: to decrease intensity and frequency of migraines  - Magnesium Oxide 400mg a day   If any diarrhea or upset stomach, decrease dose  as tolerated  - Vitamin B2 200 mg twice a day  May cause the urine to turn yellow which is normal for B 2 to do and is not a sign that you are dehydrated  - Depakote 250 mg two at bedtime  Abortive therapy for headaches:   - at the onset of her migraine headache patient is to take Maxalt 10 mg   - goal is to wean off of the  Fioricet slowly taking taking 20 a month and then 10 a month and then 5 a month and then stop  Follow up with neurology every 6 weeks to help pt manage her headaches        PLEASE DO NOT GIVE FIORICET TO PATIENT FOR HER HEADACHES OR ANY OPIOIDS     Fill Date ID   Written Drug Qty Days Prescriber Rx # Pharmacy Refill   Daily Dose* Pymt Type      02/07/2021  1   12/29/2020  Ntlmli-Sxnovpbe-Rypf -40  30 00  5 Mo Megan   [id number]   Pen (4896)   2   [place]   02/07/2021  1   01/06/2021  Lorazepam 1 MG Tablet  60 00  30 Mo Megan   [id number]   Pen (4896)   1   [place]   01/27/2021  1   12/29/2020  Stlcvr-Hxuvixxz-Vsge -40  30 00  5 Mo Megan   [id number]   Pen (4896)   1   [place]   01/06/2021  1   01/06/2021  Lorazepam 1 MG Tablet 60 00  30 Mo Megan   [id number]   Pen (4896)   0   [place]   12/30/2020  1   12/29/2020  Rqlvhg-Tbkyguwx-Socj -40  30 00  5 Mo Megan   [id number]   Pen (4896)   0   [place]   12/07/2020  1   09/02/2020  Owiozf-Rzyertah-Rbkw -40  30 00  5 Mo Megan   [id number]   Pen (4896)   0   [place]   12/07/2020  1   09/02/2020  Lorazepam 1 MG Tablet  60 00  30 Mo Megan   [id number]   Pen (4896)   1   [place]   10/30/2020  1   10/02/2020  Qfdwhw-Cioycxbf-Hara -40  30 00  5 Mo Megan   [id number]   Pen (4896)   0   [place]   10/30/2020  1   09/02/2020  Lorazepam 1 MG Tablet  60 00  30 Mo Megan   [id number]   Pen (4896)   0   [place]   08/05/2020  1   08/05/2020  Lorazepam 1 MG Tablet  60 00  30 An Bro   [id number]   Pen (4896)   0   [place]   08/05/2020  1   06/23/2020  Rvcsve-Jkmairdl-Vhjg -40  30 00  5 Mo Megan   [id number]   Pen (4896)   2   [place]   07/23/2020  1   06/23/2020  Vdlicx-Grnsnzok-Rzns -40  30 00  5 Mo Megan   [id number]   Pen (4896)   1   [place]   07/06/2020  1   05/28/2020  Lorazepam 1 MG Tablet  60 00  30 Mo Megan   [id number]   Pen (4896)   1   [place]   06/23/2020  1   06/23/2020  Sfelwv-Kpkleahd-Tffo -40  30 00  5 Mo Megan   [id number]   Pen (4896)   0   [place]   06/04/2020  1   05/28/2020  Lorazepam 1 MG Tablet  60 00  30 Mo Megan   [id number]   Pen (4896)   0   [place]   06/01/2020  1   06/01/2020  Wbzjfe-Cnnugqsb-Sbbv -40  30 00  7 Mo Megan   [id number]   Pen (4896)   0   [place]   05/12/2020  1   05/08/2020  Vsllvf-Lhdcvqgt-Ztnh -40  30 00  8 Mo Megan   [id number]   Pen (4899)   0   [place]   04/23/2020  1   03/24/2020  Lorazepam 1 MG Tablet  60 00  30 Mo Megan   [id number]   Pen (4896)   1   [place]   04/06/2020  1   04/06/2020  Tapshs-Vvbflvqx-Koeo -40  30 00  7 Mo Megan   [id number]   Pen (4852)   0   [place]   03/24/2020  1   03/24/2020  Lorazepam 1 MG Tablet  60 00  30 Mo Megan   [id number]   Pen (4896)   0   [place] 03/24/2020  1   03/24/2020  Fgpiaf-Egexxxmb-Zmzz -40  30 00  5 Mo Megan   [id number]   Pen (4896)   [place]   [place]   02/24/2020  1   02/24/2020  Lorazepam 1 MG Tablet  60 00  30 Laureen Brn   [id number]   Pen (4896)   0   [place]   02/21/2020  1   01/28/2020  Gncade-Ipdrbfcx-Enkt -40  30 00  7 Mo Megan   [id number]   Pen (4896)   2   [place]   02/10/2020  1   01/28/2020  Gglvdx-Xgexsltg-Qocn -40  30 00  7 Mo Megan   [id number]   Pen (4896)   1   [place]   01/28/2020  1   01/28/2020  Lorazepam 1 MG Tablet  60 00  30 Mo Megan   [id number]   Pen (4896)   0               Work up:   -  MRI of the head with and without contrast        Headache management instructions  - When patient has a moderate to severe headache, they should seek rest, initiate relaxation and apply cold compresses to the head  - Maintain regular sleep schedule  Adults need at least 7-8 hours of uninterrupted a night  - Limit over the counter medications such as Tylenol, Ibuprofen, Aleve, Excedrin  (No more than 2- 3 times a week or max 10 a month)  - Maintain headache diary   Free BENJIE for a smart phone, which can be used is "Migraine jones"  - Limit caffeine to 1-2 cups 8 to 16 oz a day or less  - Avoid dietary trigger  (aged cheese, peanuts, MSG, aspartame and nitrates)    - Patient is to have regular frequent meals to prevent headache onset     - Please drink at least 64 ounces of water a day to help remain hydrated

## 2021-04-06 ENCOUNTER — APPOINTMENT (OUTPATIENT)
Dept: RADIOLOGY | Facility: HOSPITAL | Age: 51
End: 2021-04-06
Attending: PSYCHIATRY & NEUROLOGY
Payer: COMMERCIAL

## 2021-04-26 ENCOUNTER — HOSPITAL ENCOUNTER (OUTPATIENT)
Dept: RADIOLOGY | Facility: HOSPITAL | Age: 51
Discharge: HOME | End: 2021-04-26
Attending: PSYCHIATRY & NEUROLOGY
Payer: COMMERCIAL

## 2021-04-26 DIAGNOSIS — G43.701 CHRONIC MIGRAINE WITHOUT AURA WITH STATUS MIGRAINOSUS, NOT INTRACTABLE: ICD-10-CM

## 2021-04-26 PROCEDURE — 70553 MRI BRAIN STEM W/O & W/DYE: CPT

## 2021-04-26 PROCEDURE — A9585 GADOBUTROL INJECTION: HCPCS | Performed by: PSYCHIATRY & NEUROLOGY

## 2021-04-26 PROCEDURE — G1004 CDSM NDSC: HCPCS

## 2021-04-26 RX ADMIN — GADOBUTROL 8 ML: 604.72 INJECTION INTRAVENOUS at 18:44

## 2021-05-03 ENCOUNTER — TELEPHONE (OUTPATIENT)
Dept: NEUROLOGY | Facility: CLINIC | Age: 51
End: 2021-05-03

## 2021-05-03 NOTE — TELEPHONE ENCOUNTER
Pt left vm stating that she recently had mri and asking for the results  Pt previously saw dr Ross Running  Seeing you on 5/10    Please advise  [phone]

## 2021-05-03 NOTE — TELEPHONE ENCOUNTER
Unremarkable brain mri, left frontal white matter lesion is not of serious concern and may be related to migraines or smoking  Will discuss in more detail at the f/u  Thanks

## 2021-05-03 NOTE — TELEPHONE ENCOUNTER
Called patient and reviewed MRI results   Patient verbalized understanding and denied any further questions at this time

## 2021-05-05 ENCOUNTER — TELEPHONE (OUTPATIENT)
Dept: NEUROLOGY | Facility: CLINIC | Age: 51
End: 2021-05-05

## 2021-05-05 NOTE — TELEPHONE ENCOUNTER
Called spoke to patient regarding incomplete labs patient states she will pick labs up from the [place] office today or tomorrow  [address] [place] [address] to make her aware patient is coming

## 2021-05-10 ENCOUNTER — TELEPHONE (OUTPATIENT)
Dept: NEUROLOGY | Facility: CLINIC | Age: 51
End: 2021-05-10

## 2021-05-10 NOTE — TELEPHONE ENCOUNTER
I spoke to patient to give lab results  She requested we call back to reschedule office visit  She prefers [address] or virtual appointment; I left her vm to call back  Can offer 7/22 or 7/27 with zeus diaz or 7/27 with Marily meier for virtual visit

## 2021-05-10 NOTE — TELEPHONE ENCOUNTER
----- Message from Juan Luis Grande sent at 5/10/2021 12:54 PM EDT -----  Please call patient, let her know her labs note mildly elevated liver function, she needs to be careful to limit her NSAID use

## 2021-05-10 NOTE — TELEPHONE ENCOUNTER
Left message for patient asking patient to call the office back to reschedule office appointment due to patient appointment having to be canceled due to provider not being in the office

## 2021-05-11 NOTE — TELEPHONE ENCOUNTER
Left message on patients machine asking patient to call the office regarding rescheduling patients appointment

## 2021-05-12 NOTE — TELEPHONE ENCOUNTER
Patient called in and wanted to know if someone can review her MRI and call with results  Mikala- would you be able to review her MRI as well and advise?

## 2021-05-13 NOTE — TELEPHONE ENCOUNTER
3rd Attempt       Left message asking patient to call the office back to reschedule patients office appointment  Unable to reach letter sent

## 2021-05-14 NOTE — TELEPHONE ENCOUNTER
Called patient and reviewed MRI with her again   Patient verbalized understanding and denied any further questions

## 2021-05-14 NOTE — TELEPHONE ENCOUNTER
Pt informed 5/3   sml are on left, nonspecifc lesion, can be related to her migraines, sml vessel  Not of a concern  Can consider re-imaging down the road

## 2021-06-11 ENCOUNTER — TELEPHONE (OUTPATIENT)
Dept: NEUROLOGY | Facility: CLINIC | Age: 51
End: 2021-06-11

## 2021-06-14 NOTE — PROGRESS NOTES
Patient ID: Sam Marrufo is a 53 y o  female  Assessment/Plan:   chronic migraine without aura  Patient with ongoing headaches, has a 2nd complete on it potentially in repeat wound secondary to chronic and ongoing use of NSAIDs, Fioricet  -- patient off Depakote, self discontinued felt ineffective  --at this time, will initiate gabapentin beginning at 300 mg at bedtime, increase to 2 tablets twice a day  Side effects reviewed  Patient to call with an update  -- patient continues to utilize excessive amounts of Fioricet, at least 2 tablets every night, when she does not have access to the Fioricet is utilizing Tylenol 7-8 tablets at night  Had a long discussion with patient regarding analgesic rebound, as well as potential issues in the long run with her liver  Recent alkaline phosphatase was mildly elevated     Patient seems to be utilizing these agents, as well as ongoing headaches secondary to issues with anxiety, significant grinding of her teeth where she has broken several in the back  Due to her mouth pain, aggravates her headaches, she subsequently then takes her medication  She is having difficulty obtaining a dentist, is in the process of looking in to the dental clinic  Discussed slow wean from her NSAID use  --pending response from above, could consider Botox  --  Patient has use of Maxalt, limit no more than 2 times a week  -- will restart vitamin B2 200 mg twice a day as well as magnesium oxide 200 mg twice a day  -- patient underwent MRI of the brain, Nonspecific cluster of small foci of high signal in the subcortical left frontal operculum  -- labs with normal TSH, mildly elevated alkaline phosphatase  --patient needs to find a dentist  --patient to follow-up with PCP regarding her anxiety    Headache management instructions  - When patient has a moderate to severe headache, they should seek rest, initiate relaxation and apply cold compresses to the head     - Maintain regular sleep schedule  Adults need at least 7-8 hours of uninterrupted a night  - Limit over the counter medications such as Tylenol, Ibuprofen, Aleve, Excedrin  (No more than 2- 3 times a week or max 10 a month)  - Maintain headache diary   Free BENJIE for a smart phone, which can be used is "Migraine jones"  - Limit caffeine to 1-2 cups 8 to 16 oz a day or less  - Avoid dietary trigger  (aged cheese, peanuts, MSG, aspartame and nitrates)  - Patient is to have regular frequent meals to prevent headache onset     - Please drink at least 64 ounces of water a day to help remain hydrated  Medication overuse headache  - Medication overuse headache [facility]) and analgesic overuse can negate the effectiveness of headache preventive measures  Avoid medications with narcotics, barbiturates, or caffeine in them as these can cause rebound headaches after very few doses and can interfere with other headache medicine efficacy  Taking  any acute/abortive over the counter medication or prescription drugs for more than 2-3 days a week can cause medication overuse headache  No problem-specific Assessment & Plan notes found for this encounter  Diagnoses and all orders for this visit:    Headache, chronic migraine without aura  -     gabapentin (NEURONTIN) 300 mg capsule; Take 1 capsule (300 mg total) by mouth 2 (two) times a day    Chronic migraine without aura with status migrainosus, not intractable  -     magnesium oxide (MAG-OX) 400 mg; Take 1 tablet (400 mg total) by mouth daily  -     Riboflavin (Vitamin B-2) 100 MG TABS; 2 in the morning and 2 at bedtime           Subjective:    PORFRIIO    Alvarado Sun is a 56-year-old right-handed female   With cervical radiculopathy, hyperlipidemia, depression, anxiety who presents today for neurologic follow-up for headaches  Patient has had headaches beginning back in her teens  She had been followed by her PCP  Patient not on any prophylactic medication    She was however given Fioricet which she had been over using, and was of little benefit  Patient was last seen in March  At that time, patient had been overusing her Fioricet, she was asked to slowly titrate off  Her headaches typically occur in the occipital region and radiate forward, at times they are in the temporal area  Patient was on Depakote 500 mg at bedtime  She was also given Maxalt  In the interim, she did undergo lab work, noted to have mildly elevated alkaline phosphatase, most likely secondary to her Fioricet  She also underwent MRI of the brain, no acute pathology,  Nonspecific foci noted in the subcortical left frontal region  Today, patient presents once again with ongoing headaches occurring daily in the temporal and or cervical area  She rates them a 6/10  Stress, rain and allergies increase her headaches  Unfortunately, patient feels her headaches are aggravated by issues with her mouth, she has severe anxiety, has issues with grinding her teeth and has broken several   In because of her jaw and teeth pain, it aggravates her headaches, she is currently in the process of trying to obtain an evaluation with a dentist   Unfortunately at this time, as she is utilizing at least to Fioricet every night, when she does not have access to Fioricet, she uses 78 Tylenol  Approximately 3 to 4 times a month, her headaches become more severe to the point where she will utilize her Maxalt which is beneficial   In the interim, patient self discontinued her Depakote, felt it was ineffective  She denies any side effects  She is no longer on cyproheptadine  Patient also had stopped her B12 and magnesium  Patient is following with her PCP for her anxiety, she is on increased dose of lorazepam   Over the past 2 weeks, she self discontinued her Wellbutrin as well  She did not inform her PCP, I asked that she speak to them          Headaches:   Any family history of migraines? none  Any family history of aneurysms? none  Headaches started at what age?  In her teen     What is your current pain level?  6/10     How often do the headaches occur? Mild headaches: daily  Moderate to severe headaches: one a day she gets a severe headache   Are you ever headache free?  no   Aura/Warning and how long does it last? Yes, nausea     What time of the day do the headaches start? Mild headaches: there all the time  Moderate to severe headaches: anytime of the day     How long do the headaches last?   Mild headaches:  There all the time  Moderate to severe headaches: few hours to few days     Describe your usual headache? Mild headaches: pressure, achy  Moderate to severe headaches: throbbing, , shooting, stabbing     Where is your headache located? Mild headaches: occipital region and radiates to the frontal region  Moderate to severe headaches: temporal region and frontal at time     What is the intensity of pain?    Mild headaches: 4-5/10  Moderate to severe headaches: varies from 9 to 10/10     Associated symptoms:   - Nausea      Vomiting        Diarrhea  - Photophobia     Phonophobia      Osmophobia  - Insomnia  -  Nasal congestion/rhinorrhea     - Stiff or sore neck   - Dizziness   light headed  - Problems with concentration  - better with lying down  - Prefer to be in a cool, quiet, dark room     Number of days missed per month because of headaches:  Work (or school) days: missed one day last year   Social or Family activities: almost all of them     Headache are worse if the patient: cough, movement  Headache triggers:  Stress, coughing, weather changes, sunlight fatigue, bright lights  What time of the year do headaches occur more frequently? worse in the winter      Have you had trigger point injection performed and how often? No  Have you had Botox injection performed and how often? No   Have you had epidural injections or transforaminal injections performed? No     Alternative therapies used in the past for headaches?  None  Have you used CBD or THC for your headaches and how often? No  How many caffeine products to drink a day? 2 liter soda a day, 1-2 glasses of caffeine a day   How much water to drink a day? Not much      What medications do you take or have you taken for your headaches? Preventive therapy:   --Depakote 500 mg(patient discontinued secondary to felt ineffective, no reported side effects)  --cyproheptadine of little benefit  - Ativan 1 mg BID, Wellbutrin 300 mg daily,     Abortive Therapy:   - Fioricet 4 times a day 30 tabs: dulls the headache, makes it manageable, but does not get rid of it    -7 to 8 tylenol if the Fioricet does not not help           Have you ever had any Brain imaging? Yes  - Reviewed old notes from physician seen in the past  - Reviewed images with the patient on Portal   Recent laboratory data was reviewed  Medications and allergies were reviewed      CT head without contrast:  May 2017:  Impression:  1  No evidence of acute intracranial hemorrhage, mass lesion, or CT evidence of acute infarct  2  Trace left mastoid fluid  MRI brain with without contrast 04/26/2021; No acute disease  Nonspecific cluster of small foci of high signal in the subcortical left frontal operculum      Labs: BMP normal, alkaline phosphatase = 124, LFTs normal, GFR = 76    The following portions of the patient's history were reviewed and updated as appropriate: allergies, current medications, past family history, past medical history, past social history, past surgical history and problem list          Objective:  Current Outpatient Medications   Medication Sig Dispense Refill    butalbital-acetaminophen-caffeine (FIORICET,ESGIC) -40 mg per tablet Take 1 tablet by mouth as needed for headaches      LORazepam (ATIVAN) 1 mg tablet 2 (two) times a day       rizatriptan (MAXALT-MLT) 10 MG disintegrating tablet Take 1 tablet (10 mg total) by mouth once as needed for migraine May repeat every 2 hours if needed, max 30mg/24 hours 12 tablet 3    rosuvastatin (CRESTOR) 5 mg tablet TAKE 1 TABLET BY MOUTH EVERY DAY AT NIGHT      buPROPion (WELLBUTRIN XL) 300 mg 24 hr tablet Take 300 mg by mouth daily       ergocalciferol (VITAMIN D2) 50,000 units Take 50,000 Units by mouth once a week       gabapentin (NEURONTIN) 300 mg capsule Take 1 capsule (300 mg total) by mouth 2 (two) times a day 60 capsule 2    hydrOXYzine HCL (ATARAX) 25 mg tablet Take 25 mg by mouth every 8 (eight) hours as needed      magnesium oxide (MAG-OX) 400 mg Take 1 tablet (400 mg total) by mouth daily 30 tablet 3    montelukast (SINGULAIR) 10 mg tablet Take 10-20 mg by mouth daily       omeprazole (PriLOSEC) 40 MG capsule Take 40 mg by mouth daily      prochlorperazine (COMPAZINE) 10 mg tablet 1 tab at onset of migraine, can repeat in 8 hours, can take with triptan/NSAID (Patient not taking: Reported on 3/9/2021) 20 tablet 3    Riboflavin (Vitamin B-2) 100 MG TABS 2 in the morning and 2 at bedtime 120 tablet 3    rizatriptan (MAXALT) 10 MG tablet Take 1 tablet (10 mg total) by mouth once as needed for migraine May repeat every 2 hours if needed, max 30mg/24 hours (Patient not taking: Reported on 3/9/2021) 12 tablet 3     No current facility-administered medications for this visit  Blood pressure 114/57, pulse 96, height 5' 5" (1 651 m), weight 76 1 kg (167 lb 12 8 oz)  Physical Exam  Constitutional:       Appearance: Normal appearance  HENT:      Head: Normocephalic  Eyes:      Extraocular Movements: Extraocular movements intact  Cardiovascular:      Rate and Rhythm: Normal rate  Pulses: Normal pulses  Pulmonary:      Effort: Pulmonary effort is normal    Musculoskeletal:         General: Normal range of motion  Cervical back: Normal range of motion  Skin:     General: Skin is warm  Neurological:      Deep Tendon Reflexes: Reflexes are normal and symmetric  Psychiatric:         Mood and Affect: Mood is anxious           Speech: Speech normal          Behavior: Behavior is hyperactive  Thought Content: Thought content normal          Neurological Exam  Mental Status  Awake, alert and oriented to person, place and time  Speech is normal  Language is fluent with no aphasia  Cranial Nerves  CN II: Visual fields full to confrontation  CN III, IV, VI: Extraocular movements intact bilaterally  CN V: Facial sensation is normal   CN VII: Full and symmetric facial movement  CN XI: Shoulder shrug strength is normal   CN XII: Tongue midline without atrophy or fasciculations  Motor  Normal muscle bulk throughout  Normal muscle tone  No abnormal involuntary movements  Strength is 5/5 in all four extremities except as noted  Right                     Left  Hip flexion                              4-                          5-  Plantarflexion                         5-                          5  Dorsiflexion                            4                          5-    Sensory  Temperature is normal in upper and lower extremities  Vibration is normal in upper and lower extremities  Reflexes  Deep tendon reflexes are 2+ and symmetric in all four extremities with downgoing toes bilaterally  Coordination  Right: Finger-to-nose normal   Left: Finger-to-nose normal     Gait  Casual gait is normal including stance, stride, and arm swing  Able to rise from chair without using arms  Ambulates independently  ROS:  Personally reviewed with patient    Review of Systems  Constitutional: Negative  Negative for appetite change and fever  HENT: Positive for hearing loss and trouble swallowing  Negative for tinnitus and voice change  Eyes: Positive for pain and visual disturbance (can't see nearsided)  Negative for photophobia  Respiratory: Positive for shortness of breath (a smoker )  Cardiovascular: Negative  Negative for palpitations     Gastrointestinal: Positive for nausea (with migraines ) and vomiting (with migraines)  Endocrine: Negative  Negative for cold intolerance  Genitourinary: Positive for frequency and urgency  Negative for dysuria  Musculoskeletal: Positive for myalgias and neck pain  Skin: Negative  Negative for rash  Neurological: Positive for dizziness (often, a couple times a day ) and headaches (still getting migraines and headaches a couple times a week )  Negative for tremors, seizures, syncope, facial asymmetry, speech difficulty, weakness, light-headedness and numbness  Hematological: Negative  Does not bruise/bleed easily  Psychiatric/Behavioral: Positive for sleep disturbance   Negative for confusion and hallucinations

## 2021-06-15 ENCOUNTER — OFFICE VISIT (OUTPATIENT)
Dept: NEUROLOGY | Facility: CLINIC | Age: 51
End: 2021-06-15
Payer: COMMERCIAL

## 2021-06-15 VITALS
HEIGHT: 65 IN | SYSTOLIC BLOOD PRESSURE: 114 MMHG | DIASTOLIC BLOOD PRESSURE: 57 MMHG | WEIGHT: 167.8 LBS | HEART RATE: 96 BPM | BODY MASS INDEX: 27.96 KG/M2

## 2021-06-15 DIAGNOSIS — G43.701 CHRONIC MIGRAINE WITHOUT AURA WITH STATUS MIGRAINOSUS, NOT INTRACTABLE: ICD-10-CM

## 2021-06-15 DIAGNOSIS — G43.709 HEADACHE, CHRONIC MIGRAINE WITHOUT AURA: Primary | ICD-10-CM

## 2021-06-15 PROCEDURE — 99215 OFFICE O/P EST HI 40 MIN: CPT | Performed by: NURSE PRACTITIONER

## 2021-06-15 RX ORDER — GABAPENTIN 300 MG/1
300 CAPSULE ORAL 2 TIMES DAILY
Qty: 60 CAPSULE | Refills: 2 | Status: SHIPPED | OUTPATIENT
Start: 2021-06-15 | End: 2021-08-02 | Stop reason: SDUPTHER

## 2021-06-15 RX ORDER — ROSUVASTATIN CALCIUM 5 MG/1
TABLET, COATED ORAL
COMMUNITY
Start: 2021-03-29

## 2021-06-15 NOTE — PATIENT INSTRUCTIONS
Patient off of Depakote  Will try gabapentin 300 mg start with 1 tablet bedtime for 1 week, if no issues, increased to 1 tablet twice a day  Patient to call the office in approximately 2-3 weeks with update  She has access to Maxalt, limit no more than 2 times a week  Patient to limit use of Fioricet, as well as Excedrin,  Patient needs to see dentist when able  Will restart vitamin B2 200 mg twice a day as well as magnesium oxide 200 mg twice a day

## 2021-07-07 DIAGNOSIS — G43.701 CHRONIC MIGRAINE WITHOUT AURA WITH STATUS MIGRAINOSUS, NOT INTRACTABLE: ICD-10-CM

## 2021-07-07 RX ORDER — RIZATRIPTAN BENZOATE 10 MG/1
10 TABLET, ORALLY DISINTEGRATING ORAL ONCE AS NEEDED
Qty: 12 TABLET | Refills: 3 | Status: CANCELLED | OUTPATIENT
Start: 2021-07-07

## 2021-07-07 NOTE — TELEPHONE ENCOUNTER
Patient calling in again  She states she did not realize she had a refill of rizatriptan left, no refill is needed for that at this time  However, she states her PCP previously prescribed her fioricet for the days her migraines were bad and since she is now following up with a neurologist they would like Neurology to take over the prescription       If agreeable to Fioricet, order pended below

## 2021-07-07 NOTE — TELEPHONE ENCOUNTER
Patient called and left vm requesting refill of rizatriptan     Order pended below, please sign if agreeable

## 2021-07-08 NOTE — TELEPHONE ENCOUNTER
Patient with medication overuse with her Fioricet, concern for analgesic rebound  We had prior discussion  When last seen she was asked to initiate gabapentin  Can you please see if she is on that agent, how are her headaches  Will only give her limited amount of Fioricet    We need to address her daily headache if gabapentin is not effective

## 2021-07-12 RX ORDER — BUTALBITAL, ACETAMINOPHEN AND CAFFEINE 50; 325; 40 MG/1; MG/1; MG/1
TABLET ORAL
Qty: 15 TABLET | Refills: 0 | Status: SHIPPED | OUTPATIENT
Start: 2021-07-12 | End: 2021-09-09 | Stop reason: SDUPTHER

## 2021-07-12 NOTE — TELEPHONE ENCOUNTER
Attempted to contact patient, left message to call the office regarding her headaches and refill on her Fioricet     -----If it patient is continuing on it to Fioricet at night, every night, will need to slowly titrate, she should decrease to 1 Fioricet at night for 1 week, then 1 Fioricet every other day the following week  Will then discontinue  --- if patient is only on 1 Fioricet at night, at this point would like her to discontinue, will substitute to with naproxen 500 mg twice a day for the next 2 weeks   Ongoing concern for analgesic rebound given consistent use of Fioricet  A prescription has been sent for a limited amount       Patient was initiated on gabapentin at last office visit, asked that she provide update

## 2021-07-13 NOTE — TELEPHONE ENCOUNTER
Can you please see if patient is willing to increase her gabapentin  Has had helped her headaches  Make sure she is tolerating it    Once again she needs to limit her Fioricet

## 2021-07-13 NOTE — TELEPHONE ENCOUNTER
Patient said she started gabapentin 300 mg bid for a couple weeks, Feels it is effective for minor headaches, but needs fiorcet for more severe headaches  I let her know a script was sent to her pharmacy for small qty  Patient will use caution and use fiorcet sparingly; verbalizes understanding  Discussed naproxen however she said it is not effective in relieving her headaches        Best cb [phone], okay to leave detailed message

## 2021-08-02 NOTE — TELEPHONE ENCOUNTER
Pt left a vm today at 10:16 am returning our call  Call back # [phone]  Called pt and advised of all of the below  She verbalized understanding  States that she is tolerating gabapentin  Currently she is taking gabapentin 300 mg bid  It has reduced a lot of her migraines and reduced the need to take fioricet  States that she is limiting the use of fioricet  Last month, she only used 6 tabs    She is agreeable to increase the gabapentin  Requesting updated script be sent to [facility] pharmacy on file       Thanks            [phone] ok to leave detailed message

## 2021-08-02 NOTE — TELEPHONE ENCOUNTER
We can increase her gabapentin, see if she is having any side effects, she can take 1 tablet 3 times a day, or if she thinks it will make her tired during the day, we can take 1 tablet in the morning and 2 at night    Let me know which she prefers and I will send a new script in

## 2021-08-03 NOTE — TELEPHONE ENCOUNTER
Patient called and left vm  She states she last spoke with a NP in our office but she states she is not sure she completely understands what is going on with her diagnosis and would like to discuss this again with her provider so she can better understand       #: [phone]

## 2021-08-05 NOTE — TELEPHONE ENCOUNTER
Called patient  Received voicemail  Left detailed message (per consent in chart) advising patient we need more details as to what she is asking and if she would like to schedule another appt to discuss her questions/concerns

## 2021-08-05 NOTE — TELEPHONE ENCOUNTER
Not quite sure what she is asking we see her for her migraines  That is what her meds afr for  Does she have any new issues that I need to know  about or questions about her headache  ?

## 2021-08-06 NOTE — TELEPHONE ENCOUNTER
Patient returned call to office  Patient states there were lesions and white matter changes on her imaging that were found  I made her aware that white matter changes are common with migraine patients  Patient would like to know more about this and would like this to be reviewed and discussed with an attending  She states she has already met with Kevin Vincent the NP and would like a Neurologist to talk to      Please advise

## 2021-08-09 NOTE — TELEPHONE ENCOUNTER
Dr Dorothy Burleson is scheduling out until mid to late January  I will call patient and offer her the first available OVL w/Dr Dorothy Burleson and Dr Bonilla Ice

## 2021-08-09 NOTE — TELEPHONE ENCOUNTER
Schedule next available with an attending for migraines   (Dr Jackson may have openings coming up or Dr Maria Rodrigez)

## 2021-08-09 NOTE — TELEPHONE ENCOUNTER
Spoke w/patient  Advised her of below message  Unable to schedule patient w/either provider prior to November  Janine Handing - could you please help schedule this patient w/Dr Maria M Cooney? Patient would like to keep 10/6 appt w/Cindi Fleming as well  [phone]

## 2021-08-09 NOTE — TELEPHONE ENCOUNTER
Scheduled 3/14/22 @ 1:30pm w/Dr Ladi Christensen in [place]  Patient declined the 1/26/22 appointment in   Added to the wait list  Patient aware an hour long appointment

## 2021-09-09 DIAGNOSIS — G43.701 CHRONIC MIGRAINE WITHOUT AURA WITH STATUS MIGRAINOSUS, NOT INTRACTABLE: ICD-10-CM

## 2021-09-09 NOTE — TELEPHONE ENCOUNTER
Patient requesting refills of the following medications:    Fioricet   Last filled 7/2021    Rizatriptan 10mg - 1 tab prn for migraine  Last filled 3/3021    LOV - 6/15/21    Scripts pended below  Mikala - please sign if agreeable

## 2021-09-09 NOTE — TELEPHONE ENCOUNTER
Please call patient, noted we increased her gabapentin last month, see how she is doing  Please get clarification on the frequency of her headaches  Patient with significant abuse of Fioricet in the past, need to maintain a close watch of her use    Will refill with limited amounts of both her Fioricet and her Maxalt

## 2021-09-13 RX ORDER — BUTALBITAL, ACETAMINOPHEN AND CAFFEINE 50; 325; 40 MG/1; MG/1; MG/1
TABLET ORAL
Qty: 10 TABLET | Refills: 0 | Status: SHIPPED | OUTPATIENT
Start: 2021-09-13 | End: 2021-11-03 | Stop reason: SDUPTHER

## 2021-09-13 RX ORDER — RIZATRIPTAN BENZOATE 10 MG/1
10 TABLET ORAL ONCE AS NEEDED
Qty: 12 TABLET | Refills: 0 | Status: SHIPPED | OUTPATIENT
Start: 2021-09-13 | End: 2021-09-21 | Stop reason: SDUPTHER

## 2021-09-21 DIAGNOSIS — G43.701 CHRONIC MIGRAINE WITHOUT AURA WITH STATUS MIGRAINOSUS, NOT INTRACTABLE: ICD-10-CM

## 2021-09-21 NOTE — TELEPHONE ENCOUNTER
Patient requested a refill on her rizatriptan  She says she accidentally threw out the bottle of the recently refilled medication

## 2021-09-23 RX ORDER — RIZATRIPTAN BENZOATE 10 MG/1
10 TABLET ORAL ONCE AS NEEDED
Qty: 12 TABLET | Refills: 0 | Status: SHIPPED | OUTPATIENT
Start: 2021-09-23 | End: 2021-11-02

## 2021-10-13 ENCOUNTER — TELEPHONE (OUTPATIENT)
Dept: NEUROLOGY | Facility: CLINIC | Age: 51
End: 2021-10-13

## 2021-10-15 ENCOUNTER — TELEPHONE (OUTPATIENT)
Dept: NEUROLOGY | Facility: CLINIC | Age: 51
End: 2021-10-15

## 2021-11-02 DIAGNOSIS — G43.701 CHRONIC MIGRAINE WITHOUT AURA WITH STATUS MIGRAINOSUS, NOT INTRACTABLE: ICD-10-CM

## 2021-11-02 RX ORDER — RIZATRIPTAN BENZOATE 10 MG/1
10 TABLET ORAL ONCE AS NEEDED
Qty: 12 TABLET | Refills: 0 | Status: SHIPPED | OUTPATIENT
Start: 2021-11-02 | End: 2021-12-06

## 2021-11-05 RX ORDER — BUTALBITAL, ACETAMINOPHEN AND CAFFEINE 50; 325; 40 MG/1; MG/1; MG/1
TABLET ORAL
Qty: 10 TABLET | Refills: 0 | Status: SHIPPED | OUTPATIENT
Start: 2021-11-05 | End: 2021-12-08 | Stop reason: SDUPTHER

## 2021-11-09 DIAGNOSIS — G43.709 HEADACHE, CHRONIC MIGRAINE WITHOUT AURA: ICD-10-CM

## 2021-11-09 RX ORDER — GABAPENTIN 300 MG/1
CAPSULE ORAL
Qty: 90 CAPSULE | Refills: 2 | Status: SHIPPED | OUTPATIENT
Start: 2021-11-09 | End: 2022-02-02 | Stop reason: SDUPTHER

## 2021-12-08 DIAGNOSIS — G43.701 CHRONIC MIGRAINE WITHOUT AURA WITH STATUS MIGRAINOSUS, NOT INTRACTABLE: ICD-10-CM

## 2021-12-08 RX ORDER — BUTALBITAL, ACETAMINOPHEN AND CAFFEINE 50; 325; 40 MG/1; MG/1; MG/1
TABLET ORAL
Qty: 10 TABLET | Refills: 0 | Status: SHIPPED | OUTPATIENT
Start: 2021-12-08 | End: 2022-01-05 | Stop reason: SDUPTHER

## 2022-01-05 DIAGNOSIS — G43.701 CHRONIC MIGRAINE WITHOUT AURA WITH STATUS MIGRAINOSUS, NOT INTRACTABLE: ICD-10-CM

## 2022-01-05 RX ORDER — RIZATRIPTAN BENZOATE 10 MG/1
10 TABLET ORAL ONCE AS NEEDED
Qty: 9 TABLET | Refills: 0 | Status: SHIPPED | OUTPATIENT
Start: 2022-01-05 | End: 2022-01-26

## 2022-01-05 RX ORDER — BUTALBITAL, ACETAMINOPHEN AND CAFFEINE 50; 325; 40 MG/1; MG/1; MG/1
TABLET ORAL
Qty: 10 TABLET | Refills: 0 | Status: SHIPPED | OUTPATIENT
Start: 2022-01-05 | End: 2022-02-02 | Stop reason: SDUPTHER

## 2022-01-05 NOTE — TELEPHONE ENCOUNTER
Pt calling to request refill  LOV 6/5/2021 Next OV 3/14/2022    Dr Darek Gusman - Rx entered  Please review and sign if in agreement

## 2022-01-26 DIAGNOSIS — G43.701 CHRONIC MIGRAINE WITHOUT AURA WITH STATUS MIGRAINOSUS, NOT INTRACTABLE: ICD-10-CM

## 2022-01-26 RX ORDER — RIZATRIPTAN BENZOATE 10 MG/1
10 TABLET ORAL ONCE AS NEEDED
Qty: 9 TABLET | Refills: 0 | Status: SHIPPED | OUTPATIENT
Start: 2022-01-26 | End: 2022-03-01 | Stop reason: SDUPTHER

## 2022-02-02 DIAGNOSIS — G43.709 HEADACHE, CHRONIC MIGRAINE WITHOUT AURA: ICD-10-CM

## 2022-02-02 DIAGNOSIS — G43.701 CHRONIC MIGRAINE WITHOUT AURA WITH STATUS MIGRAINOSUS, NOT INTRACTABLE: ICD-10-CM

## 2022-02-02 NOTE — TELEPHONE ENCOUNTER
Patient called in for a refill of her medication Butalbital-acetaminophen-caffeine (FIORICET,ESGIC) -40 mg and Gabapentin (NEURONTIN) 300 mg

## 2022-02-03 RX ORDER — GABAPENTIN 300 MG/1
CAPSULE ORAL
Qty: 90 CAPSULE | Refills: 1 | Status: SHIPPED | OUTPATIENT
Start: 2022-02-03 | End: 2022-03-14 | Stop reason: SDUPTHER

## 2022-02-03 RX ORDER — BUTALBITAL, ACETAMINOPHEN AND CAFFEINE 50; 325; 40 MG/1; MG/1; MG/1
TABLET ORAL
Qty: 10 TABLET | Refills: 0 | Status: SHIPPED | OUTPATIENT
Start: 2022-02-03 | End: 2022-03-01 | Stop reason: SDUPTHER

## 2022-02-21 ENCOUNTER — TELEPHONE (OUTPATIENT)
Dept: NEUROLOGY | Facility: CLINIC | Age: 52
End: 2022-02-21

## 2022-02-21 NOTE — TELEPHONE ENCOUNTER
Pt reports she is taking CBD  States she was previously told to look into medical marijuana for migraines, nausea, and anxiety  Taking CBD in place of this  Doing well with this regimen  States she will need to complete a drug test for her employer tomorrow  Asking if CBD can cause positive drug test      Discussed with Dr Wahl and Fariha Branham in Mikala's absence  CBD is not regulated and may contain trace amounts of THC  Also dependent on what pt's employer is testing for exactly  Advised pt of above and advised she should contact employer regarding specific test being completed and/ or  of the CBD product she uses  Pt is asking if letter can be emailed her stating our office is aware she is using CBD for migraines  Discussed with providers- we are not agreeable to letter- this is not a prescribed medication that we can comment on  Pt made aware

## 2022-03-01 DIAGNOSIS — G43.701 CHRONIC MIGRAINE WITHOUT AURA WITH STATUS MIGRAINOSUS, NOT INTRACTABLE: ICD-10-CM

## 2022-03-01 RX ORDER — BUTALBITAL, ACETAMINOPHEN AND CAFFEINE 50; 325; 40 MG/1; MG/1; MG/1
TABLET ORAL
Qty: 10 TABLET | Refills: 0 | Status: SHIPPED | OUTPATIENT
Start: 2022-03-01 | End: 2022-03-14 | Stop reason: SDUPTHER

## 2022-03-01 RX ORDER — RIZATRIPTAN BENZOATE 10 MG/1
10 TABLET ORAL ONCE AS NEEDED
Qty: 9 TABLET | Refills: 0 | Status: SHIPPED | OUTPATIENT
Start: 2022-03-01 | End: 2022-03-25

## 2022-03-04 ENCOUNTER — TELEPHONE (OUTPATIENT)
Dept: NEUROLOGY | Facility: CLINIC | Age: 52
End: 2022-03-04

## 2022-03-04 NOTE — TELEPHONE ENCOUNTER
Patient left  regarding Gabapentin dose  She states she has been having more migraines lately d/t the weather changes  She started to take one (1) additional dose of Gabapentin 300mg in the afternoon  She would like to confirm this is ok  #[phone]      Mikala - ok for patient to increase Gabapentin dose from 900mg daily to 1200mg daily?

## 2022-03-04 NOTE — TELEPHONE ENCOUNTER
I called her back  She states gabapentin is helpful, just needs 1 more tab when she gets home from work  Reviewed recent labs, normal  Denies side effects  She has enough at this time to last her until upcoming appt with Dr Rodney Gay  I told her it would be fine to increase to 1 tab in am, 1 in afternoon, and 2 at bedtime

## 2022-03-14 ENCOUNTER — OFFICE VISIT (OUTPATIENT)
Dept: NEUROLOGY | Facility: CLINIC | Age: 52
End: 2022-03-14
Payer: COMMERCIAL

## 2022-03-14 VITALS — TEMPERATURE: 98.3 F | SYSTOLIC BLOOD PRESSURE: 131 MMHG | DIASTOLIC BLOOD PRESSURE: 82 MMHG | HEART RATE: 78 BPM

## 2022-03-14 DIAGNOSIS — G43.709 HEADACHE, CHRONIC MIGRAINE WITHOUT AURA: ICD-10-CM

## 2022-03-14 DIAGNOSIS — G43.719 INTRACTABLE CHRONIC MIGRAINE WITHOUT AURA AND WITHOUT STATUS MIGRAINOSUS: Primary | ICD-10-CM

## 2022-03-14 DIAGNOSIS — G44.221 CHRONIC TENSION-TYPE HEADACHE, INTRACTABLE: ICD-10-CM

## 2022-03-14 DIAGNOSIS — G43.701 CHRONIC MIGRAINE WITHOUT AURA WITH STATUS MIGRAINOSUS, NOT INTRACTABLE: ICD-10-CM

## 2022-03-14 DIAGNOSIS — G44.40 MEDICATION OVERUSE HEADACHE: Chronic | ICD-10-CM

## 2022-03-14 DIAGNOSIS — I67.2 CEREBRAL ARTERIOSCLEROSIS: ICD-10-CM

## 2022-03-14 PROBLEM — G43.111 MIGRAINE WITH AURA, WITH INTRACTABLE MIGRAINE, SO STATED, WITH STATUS MIGRAINOSUS: Status: RESOLVED | Noted: 2019-08-22 | Resolved: 2022-03-14

## 2022-03-14 PROCEDURE — 99214 OFFICE O/P EST MOD 30 MIN: CPT | Performed by: PSYCHIATRY & NEUROLOGY

## 2022-03-14 RX ORDER — GABAPENTIN 300 MG/1
CAPSULE ORAL
Qty: 90 CAPSULE | Refills: 1 | Status: SHIPPED | OUTPATIENT
Start: 2022-03-14 | End: 2022-04-30

## 2022-03-14 RX ORDER — BUTALBITAL, ACETAMINOPHEN AND CAFFEINE 50; 325; 40 MG/1; MG/1; MG/1
TABLET ORAL
Qty: 15 TABLET | Refills: 0 | Status: SHIPPED | OUTPATIENT
Start: 2022-03-14 | End: 2022-03-18 | Stop reason: SDUPTHER

## 2022-03-14 RX ORDER — METHYLPREDNISOLONE 4 MG/1
TABLET ORAL
COMMUNITY
Start: 2022-03-14 | End: 2022-03-20

## 2022-03-14 NOTE — PATIENT INSTRUCTIONS
Limit any rescue medication including fioricet, maxalt aleve advil etc to max three days a week  Let us know when interested in botox for migraine or trial or nurtec every other day for preventative treatment of migraine

## 2022-03-14 NOTE — PROGRESS NOTES
Patient ID: Chema Mcgarry is a 49 y o  female  Assessment/Plan:    No problem-specific Assessment & Plan notes found for this encounter  Diagnoses and all orders for this visit:    Intractable chronic migraine without aura and without status migrainosus  - Pt happy with current regimen and does not prefer to change at this time  - However if interested in Botox going forward or nurtec every other day for preventative treatment she will let us know  Chronic tension-type headache, intractable    Headache, chronic migraine without aura  -     gabapentin (NEURONTIN) 300 mg capsule; Take 1 tablet in the morning, 1 tablet in the afternoon, and 2 at bedtime    Chronic migraine without aura with status migrainosus, not intractable  -     butalbital-acetaminophen-caffeine (FIORICET,ESGIC) -40 mg per tablet; Take 1 at onset of migraine, limit to no more than twice a week  -     TSH, 3rd generation with Free T4 reflex; Future  -     CBC and differential; Future  -     Comprehensive metabolic panel; Future  -     Vitamin B12; Future    Medication overuse headache  Comments:  hx of, this is improved  We discussed again limiting any abortive med to max three days a week    Cerebral arteriosclerosis  -     VAS carotid complete study; Future    Follow up in six months time with Mikala or Arun MARTINS  Subjective:    HPI      Ms  Chema Mcgarry is a pleasant 48 yo male seen in follow up first time by me for chronic migraines with possible overuse component with fioricet nsaids  She started having sinus headaches since 20s worse over time and migrainous  Did have medication overuse component slowly improving;  States stopped advil cold and sinus which she used to use BID now to max once in two to three weeks  States gabapentin significantly helpful takes it 300 mg 1 tab AM, afternoon and 2 tabs nightly with no significant a/e      No aura- however feels is coming on due to "pressure sensation" preceding migraine  Headaches are bitemporal severe light and sound sensitivity nausea and emesis with sinus pain often  Hx of sinus surgeries and on allergy regimen    Medications tried include: depakote no benefit, maxalt- helps abortively thus has not tried other triptan, and fioricet states max 15-20 tabs a month states three to four days a week as needed with benefit  States this is when she is more stressed from work/life and weather related triggers  Otherwise not using it three days a week if having a good month  Steroid taper in past with no significant benefit  Currently on gabapentin with significant benefit    She had been having pain in left hand since February non traumatic and is due to see orthopedist at [facility]  Has a tumor growth currently in splint  States overdue for mammogram and colonoscopy fmhx colon CA- we discussed preventative screening and patient reports will do this shortly  No other cancer hx  Tobacco use-- for twenty years ongoing  No HTN or DM  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[place]      The following portions of the patient's history were reviewed and updated as appropriate: allergies, current medications, past family history, past medical history, past social history, past surgical history and problem list and ROS         Objective:    Blood pressure 131/82, pulse 78, temperature 98 3 °F (36 8 °C)  Physical Exam    Gen: NAD  HEENT: NCAT, EOMI  Resp: Symmetric chest rise bl  CVS: RRR  Ext: no edema    Neurological Exam    AO X 4 no aphasia or dysarthria  CN 2-12 grossly intact  Motor: 5/5 ext x 4 except left UE 4-/5 below elbow (see above)  Sensation: Intact to LT and vibration x 4 ext  Reflexes: 2/4 biceps triceps and patellar bl  Cerebellar: No dysmetria b/l  Gait: normal range      ROS:    Review of Systems   Constitutional: Negative  Negative for appetite change and fever  HENT: Positive for trouble swallowing (Sometimes)   Negative for hearing loss, tinnitus and voice change  Eyes: Negative  Negative for photophobia and pain  Respiratory: Negative  Negative for shortness of breath  Cardiovascular: Negative  Negative for palpitations  Gastrointestinal: Negative  Negative for nausea and vomiting  Endocrine: Negative  Negative for cold intolerance  Genitourinary: Negative  Negative for dysuria, frequency and urgency  Musculoskeletal: Negative  Negative for myalgias and neck pain  Skin: Negative  Negative for rash  Neurological: Positive for dizziness (All time), weakness (Left wrist), light-headedness (All the times), numbness (Left wrist) and headaches (Has a headache today and it is getting worse)  Negative for tremors, seizures, syncope, facial asymmetry and speech difficulty  Hematological: Negative  Does not bruise/bleed easily  Psychiatric/Behavioral: Positive for sleep disturbance  Negative for confusion and hallucinations

## 2022-03-15 ENCOUNTER — TELEPHONE (OUTPATIENT)
Dept: NEUROLOGY | Facility: CLINIC | Age: 52
End: 2022-03-15

## 2022-03-15 NOTE — TELEPHONE ENCOUNTER
Pt called asking [place] [address] at [phone]  Orders faxed

## 2022-03-18 ENCOUNTER — TELEPHONE (OUTPATIENT)
Dept: NEUROLOGY | Facility: CLINIC | Age: 52
End: 2022-03-18

## 2022-03-18 DIAGNOSIS — G43.701 CHRONIC MIGRAINE WITHOUT AURA WITH STATUS MIGRAINOSUS, NOT INTRACTABLE: ICD-10-CM

## 2022-03-18 RX ORDER — BUTALBITAL, ACETAMINOPHEN AND CAFFEINE 50; 325; 40 MG/1; MG/1; MG/1
TABLET ORAL
Qty: 15 TABLET | Refills: 0 | Status: SHIPPED | OUTPATIENT
Start: 2022-03-18 | End: 2022-04-25 | Stop reason: SDUPTHER

## 2022-03-18 NOTE — TELEPHONE ENCOUNTER
pt called and states the pharm is waiting for authorization for her increased qty of 15 fioricet  Called pharm, he states that the script they received has the same instructions as last script and it can only be filled every 30 days unless you update the instuctions to reflect the need for increased qty      Can you adjust instructions and send new script

## 2022-03-23 ENCOUNTER — TELEPHONE (OUTPATIENT)
Dept: NEUROLOGY | Facility: CLINIC | Age: 52
End: 2022-03-23

## 2022-03-23 DIAGNOSIS — G43.701 CHRONIC MIGRAINE WITHOUT AURA WITH STATUS MIGRAINOSUS, NOT INTRACTABLE: ICD-10-CM

## 2022-03-23 NOTE — TELEPHONE ENCOUNTER
Pt made aware of the below  She verbalized understanding  Pt denies being ill during her bw  States that her pcp was made aware

## 2022-03-23 NOTE — TELEPHONE ENCOUNTER
----- [facility] sent at 3/23/2022 11:03 AM EDT -----  Please let patient know that her blood work was normal other than mildly high white count, was she ill during her blood work?   Thank you

## 2022-03-25 RX ORDER — RIZATRIPTAN BENZOATE 10 MG/1
10 TABLET ORAL ONCE AS NEEDED
Qty: 9 TABLET | Refills: 0 | Status: SHIPPED | OUTPATIENT
Start: 2022-03-25 | End: 2022-04-18

## 2022-04-12 ENCOUNTER — HOSPITAL ENCOUNTER (OUTPATIENT)
Dept: NON INVASIVE DIAGNOSTICS | Facility: CLINIC | Age: 52
Discharge: HOME | End: 2022-04-12
Payer: COMMERCIAL

## 2022-04-12 DIAGNOSIS — I67.2 CEREBRAL ARTERIOSCLEROSIS: ICD-10-CM

## 2022-04-12 PROCEDURE — 93880 EXTRACRANIAL BILAT STUDY: CPT | Performed by: SURGERY

## 2022-04-12 PROCEDURE — 93880 EXTRACRANIAL BILAT STUDY: CPT

## 2022-04-25 DIAGNOSIS — G43.701 CHRONIC MIGRAINE WITHOUT AURA WITH STATUS MIGRAINOSUS, NOT INTRACTABLE: ICD-10-CM

## 2022-04-25 NOTE — TELEPHONE ENCOUNTER
Pt left  requesting refill of fioricet  15 tabs     Pt last saw dr Judy Rubio on 3/14/22  Dr Judy Rubio is in pt now  would you be agreeable to sending refill  Refill entered    Please sign off if agreeable

## 2022-04-26 RX ORDER — BUTALBITAL, ACETAMINOPHEN AND CAFFEINE 50; 325; 40 MG/1; MG/1; MG/1
TABLET ORAL
Qty: 15 TABLET | Refills: 0 | Status: SHIPPED | OUTPATIENT
Start: 2022-04-26 | End: 2022-05-23 | Stop reason: SDUPTHER

## 2022-04-26 NOTE — TELEPHONE ENCOUNTER
I can send a tone time refill but as I have never seen her and she has no appointment with me (or any of us actually) she will need to establish care with one of us or if her regimen is to remain stable as is mentioned in her note her PCP can take over

## 2022-04-27 NOTE — TELEPHONE ENCOUNTER
Pt made aware of below  I scheduled pt to see zhen in sept as it was recommended in office not for a 6m f/u

## 2022-04-30 DIAGNOSIS — G43.709 HEADACHE, CHRONIC MIGRAINE WITHOUT AURA: ICD-10-CM

## 2022-04-30 RX ORDER — GABAPENTIN 300 MG/1
CAPSULE ORAL
Qty: 90 CAPSULE | Refills: 1 | Status: SHIPPED | OUTPATIENT
Start: 2022-04-30 | End: 2022-06-10

## 2022-05-23 DIAGNOSIS — G43.701 CHRONIC MIGRAINE WITHOUT AURA WITH STATUS MIGRAINOSUS, NOT INTRACTABLE: ICD-10-CM

## 2022-05-23 RX ORDER — BUTALBITAL, ACETAMINOPHEN AND CAFFEINE 50; 325; 40 MG/1; MG/1; MG/1
TABLET ORAL
Qty: 10 TABLET | Refills: 0 | Status: SHIPPED | OUTPATIENT
Start: 2022-05-23 | End: 2022-06-24 | Stop reason: SDUPTHER

## 2022-05-23 NOTE — TELEPHONE ENCOUNTER
Pt called for refill  Previously saw Elroy Simmons  She is scheduled to see you in sept  Are you agreeable to sending refill?   If so please send to [facility]   [phone] to leave detailed message

## 2022-05-23 NOTE — TELEPHONE ENCOUNTER
I refilled; is she really running out of this medication easily every month? If so would consider better prevention as discussed by Dr Tiara Miller at last office visit-botox/nurtec/increase gabapentin

## 2022-05-24 NOTE — TELEPHONE ENCOUNTER
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 Please advise

## 2022-05-24 NOTE — TELEPHONE ENCOUNTER
Hieu Farley  to Zak Carver MD          5/23/22 9:42 PM  I'm not sure how to work my chart the palak will not download so I have not use this very much I was hoping you could get this message to the right person my name is Ladi Loera my birthday is June 25th 1970 I just received a prescription of butalb caffeine an acetaminophen I had one neurologist and I was just transferred to another so I'm unclear who the contact I received and 10 tablets and it should be 15 my phone number is [phone] and you can leave a message thank you so much Sallie Tabares

## 2022-05-24 NOTE — TELEPHONE ENCOUNTER
ANCA Fortune  You 59 minutes ago (9:39 AM)         Looking back, the initial goal was to wean off fioricet dating back to when she initially saw Dr Mario Mcclain   Under my care this would be my intention as well     Reviewing PDMP I saw she was effectively at 10/month for many months and then increased back to 15 for reasons I cannot figure out, therefore I would recommend back to 10/month with the eventual plan that I will go to 5/month and then off- utilizing other agents for migraine headache if needed    Joey Cook

## 2022-05-26 NOTE — TELEPHONE ENCOUNTER
Spoke with patient again, and states for her allergies: She currrently takes Singular and startedZyrtec since they are so bad  Pt caleb she has been to numerous allergists and ENT's over the years and has tried many different medications for allergies  In regards to her Migraines, states it has taken her many years to find something that actually works well for her which is alternating Fioricet with Rizatriptan  Asking if you can increase Qty of Rizatriptan then if you wont increase qty of Fioricet  I explained about Fioricet overuse and discussion of weaning off of medication  Pt also mentioned Botox  But said she works over 1 5 hours away one way  And said it would mean for her taking off of work to get Botox injections every 3 months  Per Dr Kris Carcamo note on 3/14/22:    Intractable chronic migraine without aura and without status migrainosus  - Pt happy with current regimen and does not prefer to change at this time  - However if interested in Botox going forward or nurtec every other day for preventative treatment she will let us know  Chronic tension-type headache, intractable     Headache, chronic migraine without aura  -     gabapentin (NEURONTIN) 300 mg capsule; Take 1 tablet in the morning, 1 tablet in the afternoon, and 2 at bedtime     Chronic migraine without aura with status migrainosus, not intractable  -     butalbital-acetaminophen-caffeine (FIORICET,ESGIC) -40 mg per tablet; Take 1 at onset of migraine, limit to no more than twice a week      Please advise

## 2022-06-10 DIAGNOSIS — G43.709 HEADACHE, CHRONIC MIGRAINE WITHOUT AURA: ICD-10-CM

## 2022-06-10 RX ORDER — GABAPENTIN 300 MG/1
CAPSULE ORAL
Qty: 90 CAPSULE | Refills: 1 | Status: SHIPPED | OUTPATIENT
Start: 2022-06-10 | End: 2022-07-29 | Stop reason: SDUPTHER

## 2022-06-17 DIAGNOSIS — G43.701 CHRONIC MIGRAINE WITHOUT AURA WITH STATUS MIGRAINOSUS, NOT INTRACTABLE: ICD-10-CM

## 2022-06-17 NOTE — TELEPHONE ENCOUNTER
Pt called requesting rizatriptan refill  Advised that there is 1 refill remaining left  Pt states that pharmacy told her that there is no refill remaining     Called [facility] pharmacy, spoke w/Kush and states that rizatriptan was filled once in April and twice in May  Last filled on 5/18/22  No refills remaining left  Pt made aware  Pt states that she had increased in migraines in April and last month  She had daily migraines in April and last month due of her allergies  She takes fioricet if the rizatriptan does not work  She is currently out of rizatriptan  She would like to remain on rizatriptan  She refused try new meds  She promised to take it as prescribed, no more than 9 tabs per 30 days  Requesting rx be sent to [facility] pharmacy on file    Rx entered  Pls review and sign off if agreeable         [phone], ok to leave a detailed message

## 2022-06-20 RX ORDER — RIZATRIPTAN BENZOATE 10 MG/1
10 TABLET ORAL AS NEEDED
Qty: 9 TABLET | Refills: 2 | Status: SHIPPED | OUTPATIENT
Start: 2022-06-20

## 2022-06-24 ENCOUNTER — OFFICE VISIT (OUTPATIENT)
Dept: NEUROLOGY | Facility: CLINIC | Age: 52
End: 2022-06-24
Payer: COMMERCIAL

## 2022-06-24 VITALS
DIASTOLIC BLOOD PRESSURE: 76 MMHG | SYSTOLIC BLOOD PRESSURE: 112 MMHG | BODY MASS INDEX: 29.22 KG/M2 | HEART RATE: 86 BPM | WEIGHT: 175.6 LBS | TEMPERATURE: 97.5 F

## 2022-06-24 DIAGNOSIS — G43.701 CHRONIC MIGRAINE WITHOUT AURA WITH STATUS MIGRAINOSUS, NOT INTRACTABLE: ICD-10-CM

## 2022-06-24 PROCEDURE — 99214 OFFICE O/P EST MOD 30 MIN: CPT | Performed by: NURSE PRACTITIONER

## 2022-06-24 RX ORDER — CYPROHEPTADINE HYDROCHLORIDE 4 MG/1
TABLET ORAL
Qty: 30 TABLET | Refills: 0 | Status: SHIPPED | OUTPATIENT
Start: 2022-06-24 | End: 2022-07-18

## 2022-06-24 RX ORDER — PROCHLORPERAZINE MALEATE 10 MG
TABLET ORAL
Qty: 20 TABLET | Refills: 3 | Status: SHIPPED | OUTPATIENT
Start: 2022-06-24

## 2022-06-24 RX ORDER — BUTALBITAL, ACETAMINOPHEN AND CAFFEINE 50; 325; 40 MG/1; MG/1; MG/1
TABLET ORAL
Qty: 10 TABLET | Refills: 0 | Status: SHIPPED | OUTPATIENT
Start: 2022-06-24

## 2022-06-24 NOTE — PROGRESS NOTES
Patient ID: James Crocker is a 49 y o  female  Assessment/Plan:    Headache, chronic migraine without aura  Recent worsening of headaches given triggers of allergies as well as stress  She has been back to using advil, Fioricet as well as triptan each 2-3 times per week which we did discuss could contribute to medication overuse headaches  She tends to have have a baseline mild headache and either migraine or weather related headache daily  Will add on nurtec every other day for preventative  Will add on compazine as well for abortive/nausea  Will remain on current dose of gabapentin and rizatriptan  Have asked her to try cyproheptadine for weather related headaches  Plan  -add nurtec 75 mg QOD for headache prevention, continue current dose of gabapentin, b2, magnesium  -for abortive take rizatriptan and compazine, may repeat as directed  -add cyproheptadine 4 mg at bedtime prn for weather related headaches, would hold zyrtec on days this med is taken  If causes drowsiness can decrease to 1/2 tab  -cut back on Fioricet use-utilize 1-2 times per week for the next 2-3 weeks then stop    -Limit use of NSAID and tylenol and other abortive medications for headaches to no more than 3 times a week to prevent rebound headaches, maintain headache diary, maintain adequate fluid intake and appropriate sleep hygiene   -discussed lifestyle factors that could also be contributing, poor sleep, caffeine intake, dehydration that she will work on    To follow up as scheduled in 3 months  To contact the office sooner with any concerns or worsening symptoms  Diagnoses and all orders for this visit:    Chronic migraine without aura with status migrainosus, not intractable  -     prochlorperazine (COMPAZINE) 10 mg tablet; 1 tab at onset of migraine, can repeat in 8 hours, can take with triptan/NSAID  -     Rimegepant Sulfate (NURTEC) 75 MG TBDP;  Take 1 tab every other day for headache prevention  -     cyproheptadine (PERIACTIN) 4 mg tablet; Take 1 tab at bedtime as needed for weather related headache  -     butalbital-acetaminophen-caffeine (FIORICET,ESGIC) -40 mg per tablet; Take 1 at onset of migraine, limit to no more than twice a week           Subjective:    HPI    Ms  Junior Magana is a pleasant 48 yo male seen in follow up for headaches  last office visit 3/2022 in which headaches were well controlled, no medication changes were made  Interval History:   since last visit has had increase in migraines  She has daily headaches for the past few months-she typically has issues int he spring due to allergies  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 as well  She has seen ENT and allergist in the past with no help, currently on singulair and zyrtec and advil cold and sinus  She is taking advil cold and sinus more often however recently has backed off  Headaches start in the afternoon and get worse as day goes on-starts 7/10 then goes to 9/10  Take riztriptan, may need to repeat  She is taking triptan about 3x per week  She has to take her fioricet for weather related headaches as she finds this helps  Is take about 2-3x/week, sometimes more depending on weather  When she first started gabapentin it was helpful  She does not wake up with migraine but can have sinus headache  Past medical history: (3/2021)  QTC:  none in chart to review  Lumbar puncture done May 2017-as patient developed sudden onset of her headache after intercourse  Cervical radiculopathy   Tobacco use  Hyperlipidemia  Depression/anxiety     Mood:   Depression: Yes  Anxiety: Yes          Headaches:   Any family history of migraines? none  Any family history of aneurysms? none     Have you seen someone else for headaches or pain? PCP     Headaches started at what age? In her teen     What is your current pain level?  as of 6/24/22 rate 6/10     How often do the headaches occur?    Mild headaches: daily  Moderate to severe headaches: one a day she gets a severe headache in the afternoon     Are you ever headache free? no     Aura/Warning and how long does it last? Yes, nausea and dizziness     What time of the day do the headaches start? Mild headaches: there all the time  Moderate to severe headaches: afternoon     How long do the headaches last?   Mild headaches: There all the time  Moderate to severe headaches: few hours to 1-2 days     Describe your usual headache? Mild headaches: pressure, achy  Moderate to severe headaches: throbbing, , shooting, stabbing     Where is your headache located? Mild headaches: occipital region and radiates to the frontal region  Moderate to severe headaches: temporal region and frontal at time     What is the intensity of pain? Mild headaches: 4-5/10  Moderate to severe headaches: varies from 9 to 10/10     Associated symptoms:   - Nausea      Vomiting        Diarrhea  - Photophobia     Phonophobia      Osmophobia  - Insomnia  - Nasal congestion/rhinorrhea     - Stiff or sore neck   - Dizziness   light headed  - Problems with concentration  - better with lying down  - Prefer to be in a cool, quiet, dark room     Number of days missed per month because of headaches:  Work (or school) days: none more recently   Social or Family activities: almost all of them     Headache are worse if the patient: cough, movement  Headache triggers:  Stress, coughing, weather changes, sunlight fatigue, bright lights  What time of the year do headaches occur more frequently? worse in the spring     Have you had trigger point injection performed and how often? No  Have you had Botox injection performed and how often? No   Have you had epidural injections or transforaminal injections performed? No     Alternative therapies used in the past for headaches? None  Have you used CBD or THC for your headaches and how often?  No   How many caffeine products to drink a day? 16 oz coffee, 16 oz soda   How much water to drink a day? 30-40 oz at most, sometime less       Are you current pregnant or planning on getting pregnant? No     What medications do you take or have you taken for your headaches? Preventive therapy:   - Ativan 1 mg BID, Wellbutrin 300 mg daily,  -gabapentin, depakote  -b2, magnesium     Abortive Therapy:   - Fioricet 4 times a day 30 tabs: dulls the headache, makes it manageable, but does not get rid of it    -7 to 8 tylenol if the Fioricet does not not help    -compazine  -rizatriptan          Sleep-varies in hours, some good and bad days  Mood-under more stress recent due to extended school year        CT head without contrast:  May 2017:  Impression:  1  No evidence of acute intracranial hemorrhage, mass lesion, or CT evidence of acute infarct  2  Trace left mastoid fluid  recent labs:  carotid duplex 4/2022: Impression  RIGHT:  There is <50% stenosis noted in the internal carotid artery  Plaque is  heterogenous and irregular  Vertebral artery flow is antegrade  There is no significant subclavian artery  disease      LEFT:  There is <50% stenosis noted in the internal carotid artery  Plaque is  heterogenous and smooth  Vertebral artery flow is antegrade  There is no significant subclavian artery  disease     MRI brain 4/2021:    No acute disease   Nonspecific cluster of small foci of high signal in the subcortical left frontal operculum  3/2022 CMP normal except , TSH 0 64, b12 446, CBC normal except WBC 11 5      The following portions of the patient's history were reviewed and updated as appropriate: allergies, current medications, past family history, past medical history, past social history, past surgical history and problem list           Objective:    Blood pressure 112/76, pulse 86, temperature 97 5 °F (36 4 °C), weight 79 7 kg (175 lb 9 6 oz)  Physical Exam  Constitutional:       General: She is awake     HENT:      Right Ear: Hearing normal       Left Ear: Hearing normal    Eyes:      General: Lids are normal       Extraocular Movements: Extraocular movements intact  Pupils: Pupils are equal, round, and reactive to light  Neurological:      Mental Status: She is alert  Deep Tendon Reflexes: Strength normal       Reflex Scores:       Bicep reflexes are 2+ on the right side and 2+ on the left side  Brachioradialis reflexes are 2+ on the right side and 2+ on the left side  Patellar reflexes are 2+ on the right side and 2+ on the left side  Achilles reflexes are 2+ on the right side and 2+ on the left side  Psychiatric:         Speech: Speech normal          Neurological Exam  Mental Status  Awake and alert  Speech is normal  Language is fluent with no aphasia  Cranial Nerves  CN III, IV, VI: Extraocular movements intact bilaterally  Normal lids and orbits bilaterally  Pupils equal round and reactive to light bilaterally  CN V:  Right: Facial sensation is normal   Left: Facial sensation is normal on the left  CN VII:  Right: There is no facial weakness  Left: There is no facial weakness  CN VIII:  Right: Hearing is normal   Left: Hearing is normal   CN IX, X: Palate elevates symmetrically  CN XI:  Right: Trapezius strength is normal   Left: Trapezius strength is normal   CN XII: Tongue midline without atrophy or fasciculations  Motor   Strength is 5/5 throughout all four extremities  Sensory  Light touch is normal in upper and lower extremities  Temperature is normal in upper and lower extremities       Reflexes                                            Right                      Left  Brachioradialis                    2+                         2+  Biceps                                 2+                         2+  Patellar                                2+                         2+  Achilles                                2+                         2+    Coordination  Right: Finger-to-nose normal Left: Finger-to-nose normal     Gait  Casual gait is normal including stance, stride, and arm swing  Able to rise from chair without using arms  I have personally reviewed the ROS performed by the MA     ROS:    Review of Systems   Constitutional: Negative  Negative for appetite change and fever  HENT: Negative  Negative for hearing loss, tinnitus, trouble swallowing and voice change  Eyes: Negative  Negative for photophobia and pain  Respiratory: Negative  Negative for shortness of breath  Cardiovascular: Negative  Negative for palpitations  Gastrointestinal: Negative  Negative for nausea and vomiting  Endocrine: Negative  Negative for cold intolerance  Genitourinary: Negative  Negative for dysuria, frequency and urgency  Musculoskeletal: Negative for myalgias and neck pain  Balance Issues     Skin: Negative  Negative for rash  Allergic/Immunologic: Negative  Neurological: Positive for dizziness (Ongoing), numbness (Both arms but has gotten better recently) and headaches (Ongoing)  Negative for tremors, seizures, syncope, facial asymmetry, speech difficulty, weakness and light-headedness  Hematological: Negative  Does not bruise/bleed easily  Psychiatric/Behavioral: Positive for sleep disturbance  Negative for confusion and hallucinations  All other systems reviewed and are negative

## 2022-06-24 NOTE — PATIENT INSTRUCTIONS
Start nurtec every other day for headache prevention   Start periactin as needed at bedtime for weather related headache, if too groggy then would recommend 1/2 tab at bedtime  Would hold zyrtec the day you take this    For abortive: can continue rizatriptan, take compazine as well (nausea)   Cut back on fioricet-would recommend to limit to 1-2 times per week for the next 2-3 weeks, once you start nurtec try to eliminate     Limit use of NSAID and tylenol for headaches to no more than 3 times a week to prevent rebound headaches, maintain headache diary, maintain adequate fluid intake and appropriate sleep hygiene

## 2022-06-24 NOTE — ASSESSMENT & PLAN NOTE
Recent worsening of headaches given triggers of allergies as well as stress  She has been back to using advil, Fioricet as well as triptan each 2-3 times per week which we did discuss could contribute to medication overuse headaches  She tends to have have a baseline mild headache and either migraine or weather related headache daily  Will add on nurtec every other day for preventative  Will add on compazine as well for abortive/nausea  Will remain on current dose of gabapentin and rizatriptan  Have asked her to try cyproheptadine for weather related headaches  Plan  -add nurtec 75 mg QOD for headache prevention, continue current dose of gabapentin, b2, magnesium  -for abortive take rizatriptan and compazine, may repeat as directed  -add cyproheptadine 4 mg at bedtime prn for weather related headaches, would hold zyrtec on days this med is taken  If causes drowsiness can decrease to 1/2 tab  -cut back on Fioricet use-utilize 1-2 times per week for the next 2-3 weeks then stop    -Limit use of NSAID and tylenol and other abortive medications for headaches to no more than 3 times a week to prevent rebound headaches, maintain headache diary, maintain adequate fluid intake and appropriate sleep hygiene   -discussed lifestyle factors that could also be contributing, poor sleep, caffeine intake, dehydration that she will work on    To follow up as scheduled in 3 months  To contact the office sooner with any concerns or worsening symptoms

## 2022-07-01 NOTE — TELEPHONE ENCOUNTER
Spoke w/patient  Advised of recommendations below  Patient is agreeable to dose increase of Gabapentin  She would like to take 1 tab in AM and 2 tabs @ HS  [facility] - please enter script  Thanks! "my blood pressures are up and down and my sugars are up and down."

## 2022-07-19 NOTE — TELEPHONE ENCOUNTER
Left detailed message for pt regarding below Doxycycline Counseling:  Patient counseled regarding possible photosensitivity and increased risk for sunburn.  Patient instructed to avoid sunlight, if possible.  When exposed to sunlight, patients should wear protective clothing, sunglasses, and sunscreen.  The patient was instructed to call the office immediately if the following severe adverse effects occur:  hearing changes, easy bruising/bleeding, severe headache, or vision changes.  The patient verbalized understanding of the proper use and possible adverse effects of doxycycline.  All of the patient's questions and concerns were addressed.

## 2022-07-29 DIAGNOSIS — G43.709 HEADACHE, CHRONIC MIGRAINE WITHOUT AURA: ICD-10-CM

## 2022-08-01 RX ORDER — GABAPENTIN 300 MG/1
CAPSULE ORAL
Qty: 120 CAPSULE | Refills: 5 | Status: SHIPPED | OUTPATIENT
Start: 2022-08-01

## 2022-08-26 DIAGNOSIS — G43.701 CHRONIC MIGRAINE WITHOUT AURA WITH STATUS MIGRAINOSUS, NOT INTRACTABLE: ICD-10-CM

## 2022-09-16 DIAGNOSIS — G43.701 CHRONIC MIGRAINE WITHOUT AURA WITH STATUS MIGRAINOSUS, NOT INTRACTABLE: ICD-10-CM

## 2022-09-16 RX ORDER — RIZATRIPTAN BENZOATE 10 MG/1
10 TABLET ORAL AS NEEDED
Qty: 9 TABLET | Refills: 0 | Status: SHIPPED | OUTPATIENT
Start: 2022-09-16 | End: 2022-10-17

## 2022-09-22 ENCOUNTER — TELEPHONE (OUTPATIENT)
Dept: NEUROLOGY | Facility: CLINIC | Age: 52
End: 2022-09-22

## 2022-09-22 NOTE — TELEPHONE ENCOUNTER
Called and spoke to patient - confirmed upcoming appointment with Estiven Grey on 09/28/22 3:45 pm at the [facility] office  Provided patient with apt date, time and location  Informed patient that check in is at least 15 minutes prior to apt time  The patient is not  having any issues or concerns at this time

## 2022-09-28 ENCOUNTER — TELEPHONE (OUTPATIENT)
Dept: NEUROLOGY | Facility: CLINIC | Age: 52
End: 2022-09-28

## 2022-09-28 DIAGNOSIS — G43.701 CHRONIC MIGRAINE WITHOUT AURA WITH STATUS MIGRAINOSUS, NOT INTRACTABLE: ICD-10-CM

## 2022-09-28 RX ORDER — PROCHLORPERAZINE MALEATE 10 MG
TABLET ORAL
Qty: 20 TABLET | Refills: 3 | Status: SHIPPED | OUTPATIENT
Start: 2022-09-28

## 2022-09-28 NOTE — TELEPHONE ENCOUNTER
patient called to cancel because she is having health  insurance problems  She said she will call back once its fixed to reschedule

## 2022-10-16 DIAGNOSIS — G43.701 CHRONIC MIGRAINE WITHOUT AURA WITH STATUS MIGRAINOSUS, NOT INTRACTABLE: ICD-10-CM

## 2022-10-17 RX ORDER — RIZATRIPTAN BENZOATE 10 MG/1
TABLET ORAL
Qty: 9 TABLET | Refills: 0 | Status: SHIPPED | OUTPATIENT
Start: 2022-10-17

## 2023-01-31 DIAGNOSIS — G43.709 HEADACHE, CHRONIC MIGRAINE WITHOUT AURA: ICD-10-CM

## 2023-01-31 RX ORDER — GABAPENTIN 300 MG/1
CAPSULE ORAL
Qty: 120 CAPSULE | Refills: 5 | Status: SHIPPED | OUTPATIENT
Start: 2023-01-31

## 2023-02-01 DIAGNOSIS — G43.701 CHRONIC MIGRAINE WITHOUT AURA WITH STATUS MIGRAINOSUS, NOT INTRACTABLE: ICD-10-CM

## 2023-02-01 RX ORDER — RIZATRIPTAN BENZOATE 10 MG/1
TABLET ORAL
Qty: 9 TABLET | Refills: 0 | Status: SHIPPED | OUTPATIENT
Start: 2023-02-01

## 2023-03-01 DIAGNOSIS — G43.701 CHRONIC MIGRAINE WITHOUT AURA WITH STATUS MIGRAINOSUS, NOT INTRACTABLE: ICD-10-CM

## 2023-03-02 RX ORDER — RIZATRIPTAN BENZOATE 10 MG/1
TABLET ORAL
Qty: 9 TABLET | Refills: 0 | Status: SHIPPED | OUTPATIENT
Start: 2023-03-02

## 2023-04-02 DIAGNOSIS — G43.701 CHRONIC MIGRAINE WITHOUT AURA WITH STATUS MIGRAINOSUS, NOT INTRACTABLE: ICD-10-CM

## 2023-04-03 RX ORDER — RIZATRIPTAN BENZOATE 10 MG/1
TABLET ORAL
Qty: 9 TABLET | Refills: 0 | Status: SHIPPED | OUTPATIENT
Start: 2023-04-03

## 2023-04-30 DIAGNOSIS — G43.701 CHRONIC MIGRAINE WITHOUT AURA WITH STATUS MIGRAINOSUS, NOT INTRACTABLE: ICD-10-CM

## 2023-05-01 RX ORDER — RIZATRIPTAN BENZOATE 10 MG/1
TABLET ORAL
Qty: 9 TABLET | Refills: 0 | Status: SHIPPED | OUTPATIENT
Start: 2023-05-01

## 2023-05-29 DIAGNOSIS — G43.701 CHRONIC MIGRAINE WITHOUT AURA WITH STATUS MIGRAINOSUS, NOT INTRACTABLE: ICD-10-CM

## 2023-05-30 RX ORDER — RIZATRIPTAN BENZOATE 10 MG/1
TABLET ORAL
Qty: 9 TABLET | Refills: 0 | Status: SHIPPED | OUTPATIENT
Start: 2023-05-30

## 2023-06-22 ENCOUNTER — TELEPHONE (OUTPATIENT)
Dept: NEUROLOGY | Facility: CLINIC | Age: 53
End: 2023-06-22

## 2023-06-22 NOTE — TELEPHONE ENCOUNTER
Hi, my name is Lang Ma. My phone number is [phone]. I was calling. I have been taking the compazine and the one that they have me on for allergies. The only problem most of them knocked me out and during allergies in and through the summer it's just. I want to stay on gabapentin and the rizatriptan but not enough for the entire month. And I am back in getting migraines every day. And I was just wondering if there's another non drowsy prescription i can add to the gabapentin and rizatriptan on those days that I no longer have room because they still get just the same they were. All right, thank you very much bye. Called pt to let her know that I received her message. States that she takes Gabapentin 300 mg 1 cap in AM, 2 caps in afternoon, 2 caps at bedtime. Rizatriptan 9 tabs is not enough for a month. During allergy season, she has been having daily migraines, more intense. She takes Compazine 10 mg prn-it makes her slightly tired. She stopped Cyproheptadine 4 mg 1 tab prn at bedtime prn  bec it makes her really groggy. Insurance only approved Nurtec for one month. Nurtec does not seem to help anyways per pt.   fioricet prn as ordered. It helps. b complex 1 tab daily, mag 400 mg daily  Pt is requesting abortive med that will not make her groggy be sent to [facility] pharmacy on file.      Cb [phone], ok to leave a detailed message

## 2023-06-23 NOTE — TELEPHONE ENCOUNTER
She has not been seen in almost a year-is she able to schedule appt with me or any other headache AP (Bryan, Brian Luna or JES COSTA)? For now if she is having daily headaches it would be best for her to add on medications for headache prevention to reduce severity and frequency of migraine. When she took nurtec was she taking every other day or just as needed? If she does not have a history of kidney stones we could consider a trial of topiramate until she is seen. We could also try a short course of steroids to try to break her current headache cycle if she is interested.

## 2023-06-27 DIAGNOSIS — G43.701 CHRONIC MIGRAINE WITHOUT AURA WITH STATUS MIGRAINOSUS, NOT INTRACTABLE: ICD-10-CM

## 2023-06-27 RX ORDER — RIZATRIPTAN BENZOATE 10 MG/1
TABLET ORAL
Qty: 9 TABLET | Refills: 0 | Status: SHIPPED | OUTPATIENT
Start: 2023-06-27

## 2023-07-25 DIAGNOSIS — G43.701 CHRONIC MIGRAINE WITHOUT AURA WITH STATUS MIGRAINOSUS, NOT INTRACTABLE: ICD-10-CM

## 2023-07-25 RX ORDER — RIZATRIPTAN BENZOATE 10 MG/1
TABLET ORAL
Qty: 9 TABLET | Refills: 0 | Status: SHIPPED | OUTPATIENT
Start: 2023-07-25

## 2023-07-26 NOTE — TELEPHONE ENCOUNTER
1ST ATTEMPT - Called patient and LVM to call the office back to schedule follow-up with Becky or any of the headache AP's. As of now, Caty Parks has an opening on 8/3/23 in [facility]. If it is still available by the time patient calls back, please offer. Thanks!

## 2023-07-26 NOTE — TELEPHONE ENCOUNTER
Please assist in scheduling follow up per Becky's note. LOV was 6/2022 with Les Sepulveda  Thank you!

## 2023-07-27 DIAGNOSIS — G43.709 HEADACHE, CHRONIC MIGRAINE WITHOUT AURA: ICD-10-CM

## 2023-07-27 RX ORDER — GABAPENTIN 300 MG/1
CAPSULE ORAL
Qty: 120 CAPSULE | Refills: 5 | Status: SHIPPED | OUTPATIENT
Start: 2023-07-27

## 2023-09-22 NOTE — PROGRESS NOTES
Daily Note     Today's date: 9/3/2019  Patient name: Capo Payton  : 1970  MRN: [id number]  Referring provider: Rocky Pacheco MD  Dx:   Encounter Diagnosis     ICD-10-CM    1  Dizziness and giddiness R42                   Subjective: Dizziness not to bad today, this am was about 5-6/10  Also with complaints of migraine today, this am rated 6-7/10 took medication and is feeling better  Patient arrived to PT 15 minutes late for unknown reason     Objective: See treatment diary below  1:1 with PT from 1:45-2 total 15 min  Grouped from 2:00-2:10p total of 10 min     Assessment: required verbal cues for proper performance of VOR and smooth pursuit exercises  With exaggerated movements to maintain balance with dynamic walking in the hallway advised patient to keep feet apart, avoid NBOS and  To focus on weight shifting - slight improvements noted  Plan: Progress treatment as tolerated          Short Term Goal Expiration Date:(19)  Long Term Goal Expiration Date: (10/14/19)  POC Expiration Date: (10/14/19)        Precautions NA        Manual                                                                                          Exercise Diary   09/3         UT S 30" x 3  30"x2         LS S 30" x 2  30"x2         VOR x 1  45" x 2 standing plain   standing plain   H45"  V 45"         Ambulation with HT/HN   2 laps ea fw/bw     2 laps ea fw/bw         Tandem stance             Tandem gait   2 laps   2 laps          Ambulation with turns     1/2 turns 40ft ea          Smooth pursuits   CW/CCW 45" x 2   CW/CCW 45" x 2                                                                                                                                                                                       Modalities                                                         O-T Advancement Flap Text: The defect edges were debeveled with a #15 scalpel blade. Given the location of the defect, shape of the defect and the proximity to free margins an O-T advancement flap was deemed most appropriate. Using a sterile surgical marker, an appropriate advancement flap was drawn incorporating the defect and placing the expected incisions within the relaxed skin tension lines where possible. The area thus outlined was incised deep to adipose tissue with a #15 scalpel blade. The skin margins were undermined to an appropriate distance in all directions utilizing iris scissors. Following this, the designed flap was advanced and carried over into the primary defect and sutured into place.

## 2024-04-30 NOTE — PROGRESS NOTES
Review of Systems [facility]  Operative Note      DATE OF PROCEDURE: 4/30/2024    SURGEON: Agus Gamino MD    ASSISTANT: MD Luis Eduardo; MD Gold; First Neida Gamble    PREOPERATIVE DIAGNOSIS:Acute Cholecystitis    POSTOPERATIVE DIAGNOSIS: Acute  Cholecystitis, Gallstones    OPERATION: Laparoscopic cholecystectomy with intraoperative cholangiogram    ANESTHESIA: General endotracheal.    ESTIMATED BLOOD LOSS: Less than 10 mL.    COMPLICATIONS: None.    FLUIDS: Crystalloid.    SPECIMEN: Gallbladder.    DESCRIPTION OF PROCEDURE: This is a 37 y.o. female increasingly symptomatic right upper quadrant epigastric pain. After being explained the risks, benefits, and alternatives of the procedure, the patient agreed to proceed. We discussed at length the risks of bleeding, biliary and liver ductal injury, need for repeat or open procedures, need for catheter drainage and prolonged hospitalization. The patient agreed to proceed.    The patient was taken to the operating room and placed supine on the operating room table, administered general anesthesia and intubated. Once the airway was secured and the patient was adequately sedated a time-out was performed to confirm the surgical site and the patient's name.     We initially made a 5-mm incision superior to the umbilicus, inserted a Veress needle, confirmed needle placement and insufflated to 15 mmHg. We then removed the Veress needle and inserted a 5-mm trocar and inserted a camera to follow, and inspected the abdomen. Upon inspection of the abdomen, there was significant inflammation around the right upper quadrant near the gallbladder. A 12-mm trocar was inserted subxiphoid just right and lateral to the falciform ligament and two more 5-mm trocars in the right upper quadrant. Atraumatic graspers were used grasp the gallbladder and retract cephalad. The gallbladder was distended, and too taught to grasp. Cautery was used to open

## 2024-07-10 ENCOUNTER — TELEPHONE (OUTPATIENT)
Age: 54
End: 2024-07-10

## 2024-07-10 NOTE — TELEPHONE ENCOUNTER
Pt called to set up a f/U appt with dr. Álvarez.     Appt is made for 8/29/24. Dr. Álvarez.     Pt wants to know if she would need a MRI done before appt.     Please assist pt .   [phone]

## 2024-08-19 ENCOUNTER — TELEPHONE (OUTPATIENT)
Dept: NEUROLOGY | Facility: CLINIC | Age: 54
End: 2024-08-19

## 2024-08-19 NOTE — TELEPHONE ENCOUNTER
Called patient to confirm upcoming appointment on 8/29/24 with Bryan Álvarez in the [place] office.  LMOM

## 2024-08-19 NOTE — TELEPHONE ENCOUNTER
08/19/24    Patient called office returning a voice message left.    Informed patient the reason of our call and provided appt date, time, and location.    Appt confirmed.       Any questions, please contact patient.   Thank You.

## 2024-08-29 ENCOUNTER — OFFICE VISIT (OUTPATIENT)
Dept: NEUROLOGY | Facility: CLINIC | Age: 54
End: 2024-08-29
Payer: COMMERCIAL

## 2024-08-29 VITALS
OXYGEN SATURATION: 97 % | HEIGHT: 65 IN | WEIGHT: 158.8 LBS | HEART RATE: 94 BPM | SYSTOLIC BLOOD PRESSURE: 110 MMHG | TEMPERATURE: 97.8 F | DIASTOLIC BLOOD PRESSURE: 66 MMHG | BODY MASS INDEX: 26.46 KG/M2

## 2024-08-29 DIAGNOSIS — R13.10 SWALLOWING DIFFICULTY: ICD-10-CM

## 2024-08-29 DIAGNOSIS — R53.1 LEFT-SIDED WEAKNESS: ICD-10-CM

## 2024-08-29 DIAGNOSIS — R90.82 WHITE MATTER ABNORMALITY ON MRI OF BRAIN: Primary | ICD-10-CM

## 2024-08-29 DIAGNOSIS — G43.701 CHRONIC MIGRAINE WITHOUT AURA WITH STATUS MIGRAINOSUS, NOT INTRACTABLE: ICD-10-CM

## 2024-08-29 DIAGNOSIS — R26.89 BALANCE PROBLEM: ICD-10-CM

## 2024-08-29 DIAGNOSIS — R73.01 ELEVATED FASTING GLUCOSE: ICD-10-CM

## 2024-08-29 PROCEDURE — 99214 OFFICE O/P EST MOD 30 MIN: CPT | Performed by: NURSE PRACTITIONER

## 2024-08-29 RX ORDER — MELOXICAM 15 MG/1
15 TABLET ORAL DAILY
COMMUNITY
Start: 2024-05-29

## 2024-08-29 RX ORDER — BUTALBITAL, ACETAMINOPHEN AND CAFFEINE 50; 325; 40 MG/1; MG/1; MG/1
TABLET ORAL
Qty: 10 TABLET | Refills: 1 | Status: SHIPPED | OUTPATIENT
Start: 2024-08-29

## 2024-08-29 NOTE — PROGRESS NOTES
Ambulatory Visit  Name: Kat Rosas      : 1970      MRN: [id number]  Encounter Provider: ANCA King  Encounter Date: 2024   Encounter department: [facility] [place]    Assessment & Plan   1. White matter abnormality on MRI of brain  -     MRI brain with and without contrast; Future; Expected date: 2024  -     CBC and differential; Future  -     Comprehensive metabolic panel; Future  -     Hemoglobin A1C; Future  -     Lipid Panel with Direct LDL reflex; Future  2. Left-sided weakness  -     MRI brain with and without contrast; Future; Expected date: 2024  -     CBC and differential; Future  -     Comprehensive metabolic panel; Future  3. Balance problem  -     MRI brain with and without contrast; Future; Expected date: 2024  -     Lipid Panel with Direct LDL reflex; Future  4. Chronic migraine without aura with status migrainosus, not intractable  -     butalbital-acetaminophen-caffeine (FIORICET,ESGIC) -40 mg per tablet; Take 1 at onset of migraine, limit to no more than twice a week  -     CBC and differential; Future  -     Comprehensive metabolic panel; Future  5. Swallowing difficulty  -     FL barium swallow video w speech; Future; Expected date: 2024  6. Elevated fasting glucose  -     Hemoglobin A1C; Future    Patient Instructions/Plan:  Start 81mg aspirin  Repeat MRI brain and labs and swallow study  Continue current gabapentin for prevention and as needed fioricet for acute management and hold off on maxalt at this time until we get further testing  We will look into botox for chronic migraine  Continue with healthy lifestyle- good hydration, regular meals, working on reducing/quitting smoking  Follow up in 3 months     History of Present Illness   Kat Rosas is a 54 y.o. female with past medical history of allergies, anxiety, GERD, migraine, hyperlipidemia, tobacco use who presents for follow up regarding migraine,  last office visit 6/24/2022. She states she has continued with daily gabapentin, magnesium and riboflavin for migraine prevention and uses as needed maxalt for acute management. She states the headaches have worsened in frequency; she denies change in location/duration/associated symptoms. She states the current frequency is daily. Headaches start in the am but get worse as the day goes on. She states she runs out of maxalt every month. She has not had fioricet to use. She is using singulair/zyrtec/flonase for allergies. She was having previous trouble with insurance but is able to go for testing now. She states she is unsure exactly when it occurred but she has been having left sided weakness and more balance difficulty and also more recently she has had trouble swallowing at times (mentions she had stopped omeprazole because the insurance no longer covered it). She states she did trial nurtec since last visit but it was not helpful. Previous preventative medications like topamax, depakote, cyproheptadine caused too much fatigue. She has been working as a  in [employer]. She continues with smoking 1-2 packs/day. No recent labs to review.     Previous History:  Headaches:   Any family history of migraines? none  Any family history of aneurysms? none     Have you seen someone else for headaches or pain?  PCP     Headaches started at what age?  In her teen     What is your current pain level?  as of 6/24/22 rate 6/10     How often do the headaches occur?   Mild headaches: daily  Moderate to severe headaches: one a day she gets a severe headache in the afternoon     Are you ever headache free?  no     Aura/Warning and how long does it last? Yes, nausea and dizziness     What time of the day do the headaches start?   Mild headaches: there all the time  Moderate to severe headaches: afternoon     How long do the headaches last?   Mild headaches:  There all the time  Moderate to severe headaches: few  hours to 1-2 days     Describe your usual headache?  Mild headaches: pressure, achy  Moderate to severe headaches: throbbing, , shooting, stabbing     Where is your headache located?   Mild headaches: occipital region and radiates to the frontal region  Moderate to severe headaches: temporal region and frontal at time     What is the intensity of pain?   Mild headaches: 4-5/10  Moderate to severe headaches: varies from 9 to 10/10     Associated symptoms:   - Nausea      Vomiting        Diarrhea  - Photophobia     Phonophobia      Osmophobia  - Insomnia  - Nasal congestion/rhinorrhea     - Stiff or sore neck   - Dizziness   light headed  - Problems with concentration  - better with lying down  - Prefer to be in a cool, quiet, dark room     Number of days missed per month because of headaches:  Work (or school) days: none more recently   Social or Family activities: almost all of them     Headache are worse if the patient: cough, movement  Headache triggers:  Stress, coughing, weather changes, sunlight fatigue, bright lights  What time of the year do headaches occur more frequently? worse in the spring     Have you had trigger point injection performed and how often? No  Have you had Botox injection performed and how often? No   Have you had epidural injections or transforaminal injections performed? No     Alternative therapies used in the past for headaches?  None  Have you used CBD or THC for your headaches and how often? No   How many caffeine products to drink a day? 16 oz coffee, 16 oz soda   How much water to drink a day? 30-40 oz at most, sometime less       Are you current pregnant or planning on getting pregnant? No      carotid duplex 4/2022: Impression  RIGHT:  There is <50% stenosis noted in the internal carotid artery. Plaque is  heterogenous and irregular.  Vertebral artery flow is antegrade. There is no significant subclavian artery  disease.  LEFT:  There is <50% stenosis noted in the internal carotid  artery. Plaque is  heterogenous and smooth.  Vertebral artery flow is antegrade. There is no significant subclavian artery  disease     MRI brain 4/2021:    No acute disease.  Nonspecific cluster of small foci of high signal in the subcortical left frontal operculum.    Review of Systems   Constitutional:  Positive for fatigue.   HENT: Negative.     Eyes: Negative.    Respiratory: Negative.     Cardiovascular: Negative.    Gastrointestinal:  Positive for nausea and vomiting.   Endocrine: Negative.    Genitourinary: Negative.    Musculoskeletal:  Positive for neck pain and neck stiffness.   Skin: Negative.    Allergic/Immunologic: Negative.    Neurological:  Positive for dizziness, weakness, light-headedness and headaches.   Hematological: Negative.    Psychiatric/Behavioral: Negative.     Vision - blurriness   Pt is getting dysphagia   Depth perception is off especially when walking up or down the stairs.   Balance is off - no recent falls  Sensitivity to light, noise, strong smells  ROS was reviewed and updated as appropriate      Current Outpatient Medications on File Prior to Visit   Medication Sig Dispense Refill    ergocalciferol (VITAMIN D2) 50,000 units Take 50,000 Units by mouth once a week      gabapentin (NEURONTIN) 300 mg capsule TAKE 1 CAPSULE IN THE MORNING, 1 CAPSULE IN THE AFTERNOON, AND 2 CAPSULES AT BEDTIME 120 capsule 5    LORazepam (ATIVAN) 1 mg tablet 2 (two) times a day       magnesium oxide (MAG-OX) 400 mg Take 1 tablet (400 mg total) by mouth daily 30 tablet 3    meloxicam (MOBIC) 15 mg tablet Take 15 mg by mouth daily      montelukast (SINGULAIR) 10 mg tablet Take 10-20 mg by mouth daily       omeprazole (PriLOSEC) 40 MG capsule Take 40 mg by mouth daily      prochlorperazine (COMPAZINE) 10 mg tablet TAKE 1 TABLET AT ONSET OF MIGRAINE, CAN REPEAT IN 8 HOURS ( CAN BE TAKEN WITH TRIPTAN/NSAID) 20 tablet 3    Riboflavin (Vitamin B-2) 100 MG TABS 2 in the morning and 2 at bedtime 120 tablet 3     "rizatriptan (MAXALT) 10 mg tablet TAKE 1 TABLET BY MOUTH IF NEEDED FOR MIGRAINE MAY REPEAT EVERY 2 HOURS IF NEEDED, MAX 30MG/24 HOURS 9 tablet 0    rosuvastatin (CRESTOR) 5 mg tablet TAKE 1 TABLET BY MOUTH EVERY DAY AT NIGHT      Diclofenac Sodium (VOLTAREN) 1 % Apply 1 application topically 4 (four) times a day      [DISCONTINUED] buPROPion (WELLBUTRIN XL) 300 mg 24 hr tablet Take 300 mg by mouth daily       [DISCONTINUED] butalbital-acetaminophen-caffeine (FIORICET,ESGIC) -40 mg per tablet Take 1 at onset of migraine, limit to no more than twice a week (Patient not taking: Reported on 8/29/2024) 10 tablet 0    [DISCONTINUED] cyproheptadine (PERIACTIN) 4 mg tablet TAKE 1 TAB AT BEDTIME AS NEEDED FOR WEATHER RELATED HEADACHE (Patient not taking: Reported on 8/29/2024) 90 tablet 0    [DISCONTINUED] hydrOXYzine HCL (ATARAX) 25 mg tablet Take 25 mg by mouth every 8 (eight) hours as needed (Patient not taking: Reported on 3/14/2022)      [DISCONTINUED] Rimegepant Sulfate (NURTEC) 75 MG TBDP Take 1 tab every other day for headache prevention (Patient not taking: Reported on 8/29/2024) 16 tablet 5     No current facility-administered medications on file prior to visit.      Social History     Tobacco Use    Smoking status: Every Day     Current packs/day: 2.00     Types: Cigarettes    Smokeless tobacco: Never    Tobacco comments:     Sometimes 3 packs a day    Vaping Use    Vaping status: Some Days    Substances: Nicotine   Substance and Sexual Activity    Alcohol use: Yes     Comment: Socially     Drug use: Not Currently    Sexual activity: Not on file     Objective     /66 (BP Location: Right arm, Patient Position: Sitting, Cuff Size: Standard)   Pulse 94   Temp 97.8 °F (36.6 °C) (Temporal)   Ht 5' 5\" (1.651 m)   Wt 72 kg (158 lb 12.8 oz)   SpO2 97%   BMI 26.43 kg/m²     Physical Exam  Vitals reviewed.   Constitutional:       General: She is not in acute distress.  HENT:      Head: Normocephalic and " atraumatic.      Right Ear: External ear normal.      Left Ear: External ear normal.      Nose: Nose normal.      Mouth/Throat:      Mouth: Mucous membranes are moist.   Eyes:      Extraocular Movements: Extraocular movements intact.      Pupils: Pupils are equal, round, and reactive to light.   Cardiovascular:      Rate and Rhythm: Normal rate and regular rhythm.      Pulses: Normal pulses.      Heart sounds: Normal heart sounds.   Pulmonary:      Effort: Pulmonary effort is normal.      Breath sounds: Normal breath sounds.   Abdominal:      General: There is no distension.      Tenderness: There is no abdominal tenderness.   Musculoskeletal:      Cervical back: No tenderness.      Right lower leg: No edema.      Left lower leg: No edema.   Skin:     General: Skin is warm.      Capillary Refill: Capillary refill takes less than 2 seconds.      Findings: No rash.   Neurological:      Mental Status: She is alert and oriented to person, place, and time.      Cranial Nerves: Cranial nerves 2-12 are intact.      Coordination: Romberg Test abnormal.      Gait: Gait is intact.   Psychiatric:         Mood and Affect: Mood is anxious.         Speech: Speech normal.       Neurologic Exam     Mental Status   Oriented to person, place, and time.   Attention: normal. Concentration: normal.   Speech: speech is normal   Level of consciousness: alert  Knowledge: good.   Able to name object.     Cranial Nerves   Cranial nerves II through XII intact.     CN III, IV, VI   Pupils are equal, round, and reactive to light.    Motor Exam   Muscle bulk: normal  Overall muscle tone: normal    Strength   Strength 5/5 except as noted. Left hand grasp weakness and distal 4/5 weakness; left knee flexion weakness 4/5     Sensory Exam   Light touch normal.     Gait, Coordination, and Reflexes     Gait  Gait: normal    Coordination   Romberg: positive    Tremor   Resting tremor: absent  Action tremor: absent    Reflexes   Reflexes 2+ except as  noted.

## 2024-08-29 NOTE — PATIENT INSTRUCTIONS
Start 81mg aspirin  Repeat MRI brain and labs and swallow study  Continue current gabapentin for prevention and as needed fioricet for acute management and hold off on maxalt at this time until we get further testing  We will look into botox for chronic migraine  Continue with healthy lifestyle- good hydration, regular meals, working on reducing/quitting smoking  Follow up in 3 months

## 2024-09-09 ENCOUNTER — TELEPHONE (OUTPATIENT)
Dept: ADMINISTRATIVE | Facility: HOSPITAL | Age: 54
End: 2024-09-09

## 2024-09-09 NOTE — TELEPHONE ENCOUNTER
DELIVERY SCHEDULED  S/W JERMAN WHO CALLED FROM [employer]  Botox 200 UNITS  Qty. 1  Scheduled: 9/11/24  Location: [place]  Via: LIA/UPS     Please advise if Botox does not arrive.     Please also schedule pt. Thank you.

## 2024-09-10 ENCOUNTER — HOSPITAL ENCOUNTER (OUTPATIENT)
Dept: MRI IMAGING | Facility: HOSPITAL | Age: 54
Discharge: HOME | End: 2024-09-10
Payer: COMMERCIAL

## 2024-09-10 ENCOUNTER — TELEPHONE (OUTPATIENT)
Dept: NEUROLOGY | Facility: CLINIC | Age: 54
End: 2024-09-10

## 2024-09-10 DIAGNOSIS — R90.82 WHITE MATTER ABNORMALITY ON MRI OF BRAIN: ICD-10-CM

## 2024-09-10 DIAGNOSIS — R26.89 BALANCE PROBLEM: ICD-10-CM

## 2024-09-10 DIAGNOSIS — R53.1 LEFT-SIDED WEAKNESS: ICD-10-CM

## 2024-09-10 LAB
ALBUMIN SERPL-MCNC: 4.3 G/DL (ref 3.5–5.7)
ALP SERPL-CCNC: 88 U/L (ref 35–120)
ALT SERPL-CCNC: 12 U/L
ANION GAP SERPL CALCULATED.3IONS-SCNC: 9 MMOL/L (ref 3–11)
AST SERPL-CCNC: 12 U/L
BASOPHILS # BLD AUTO: 0.1 THOU/CMM (ref 0–0.1)
BASOPHILS NFR BLD AUTO: 1 %
BILIRUB SERPL-MCNC: 0.4 MG/DL (ref 0.2–1)
BUN SERPL-MCNC: 19 MG/DL (ref 7–25)
CALCIUM SERPL-MCNC: 9.5 MG/DL (ref 8.5–10.1)
CHLORIDE SERPL-SCNC: 106 MMOL/L (ref 100–109)
CHOLEST SERPL-MCNC: 244 MG/DL
CHOLEST/HDLC SERPL: 3.8 {RATIO}
CO2 SERPL-SCNC: 27 MMOL/L (ref 21–31)
CREAT SERPL-MCNC: 0.84 MG/DL (ref 0.4–1.1)
CYTOLOGY CMNT CVX/VAG CYTO-IMP: NORMAL
DIFFERENTIAL METHOD BLD: ABNORMAL
EOSINOPHIL # BLD AUTO: 0.4 THOU/CMM (ref 0–0.5)
EOSINOPHIL NFR BLD AUTO: 5 %
ERYTHROCYTE [DISTWIDTH] IN BLOOD BY AUTOMATED COUNT: 12.7 % (ref 12–16)
EST. AVERAGE GLUCOSE BLD GHB EST-MCNC: 114 MG/DL
GFR/BSA.PRED SERPLBLD CYS-BASED-ARV: 83 ML/MIN/{1.73_M2}
GLUCOSE SERPL-MCNC: 89 MG/DL (ref 65–99)
HBA1C MFR BLD: 5.6 %
HCT VFR BLD AUTO: 42.5 % (ref 35–43)
HDLC SERPL-MCNC: 64 MG/DL (ref 23–92)
HGB BLD-MCNC: 14.3 G/DL (ref 11.5–14.5)
LDLC SERPL CALC-MCNC: 147 MG/DL
LYMPHOCYTES # BLD AUTO: 3.3 THOU/CMM (ref 1–3)
LYMPHOCYTES NFR BLD AUTO: 42 %
MCH RBC QN AUTO: 33.9 PG (ref 26–34)
MCHC RBC AUTO-ENTMCNC: 33.5 G/DL (ref 32–37)
MCV RBC AUTO: 101 FL (ref 80–100)
MONOCYTES # BLD AUTO: 0.4 THOU/CMM (ref 0.3–1)
MONOCYTES NFR BLD AUTO: 6 %
NEUTROPHILS # BLD AUTO: 3.8 THOU/CMM (ref 1.8–7.8)
NEUTROPHILS NFR BLD AUTO: 46 %
NONHDLC SERPL-MCNC: 180 MG/DL
PLATELET # BLD AUTO: 349 THOU/CMM (ref 140–350)
PMV BLD REES-ECKER: 7.9 FL (ref 7.5–11.3)
POTASSIUM SERPL-SCNC: 4.3 MMOL/L (ref 3.5–5.2)
PROT SERPL-MCNC: 6.3 G/DL (ref 6.3–8.3)
RBC # BLD AUTO: 4.2 MILL/CMM (ref 3.7–4.7)
SODIUM SERPL-SCNC: 142 MMOL/L (ref 135–145)
TRIGL SERPL-MCNC: 166 MG/DL
WBC # BLD AUTO: 8 THOU/CMM (ref 4–10)

## 2024-09-10 PROCEDURE — A9585 GADOBUTROL INJECTION: HCPCS | Performed by: NURSE PRACTITIONER

## 2024-09-10 PROCEDURE — 70553 MRI BRAIN STEM W/O & W/DYE: CPT

## 2024-09-10 RX ORDER — GADOBUTROL 604.72 MG/ML
7 INJECTION INTRAVENOUS
Status: COMPLETED | OUTPATIENT
Start: 2024-09-10 | End: 2024-09-10

## 2024-09-10 RX ADMIN — GADOBUTROL 7 ML: 604.72 INJECTION INTRAVENOUS at 21:50

## 2024-09-18 ENCOUNTER — TELEPHONE (OUTPATIENT)
Age: 54
End: 2024-09-18

## 2024-09-18 NOTE — TELEPHONE ENCOUNTER
Called and LMOM to schedule Botox appt. Please have patient scheduled into Bryan's next available Botox slot.

## 2024-09-18 NOTE — TELEPHONE ENCOUNTER
Inbound call received from patient.     Reviewed MRI results with patient. Patient stated that she has had a fever, runny nose and some drainage of the ears the past few days. Stated that there is a sickness going around pt's work.  Patient aware to contact PCP to discuss further. Patient verbalized understanding.     ANCA Eastman  9/16/2024  8:09 AM EDT       No significant change to MRI brain when compared to study in 2021  There was a left mastoid effusion; if any fever, ear pain/drainage etc I would suggest primary care or ENT evaluation/treatment.  -Bryan

## 2024-09-18 NOTE — TELEPHONE ENCOUNTER
Patient is asking to schedule a Botox Appt. Requesting if an appt can be scheduled after 330 PM.  Please call patient to set up an appt.

## 2024-09-19 ENCOUNTER — TELEPHONE (OUTPATIENT)
Dept: NEUROLOGY | Facility: CLINIC | Age: 54
End: 2024-09-19

## 2024-09-19 NOTE — TELEPHONE ENCOUNTER
Called pt in regards to a message about scheduling her Botox injections. LMOM for pt to call us to schedule

## 2024-10-08 ENCOUNTER — TELEPHONE (OUTPATIENT)
Dept: NEUROLOGY | Facility: CLINIC | Age: 54
End: 2024-10-08

## 2024-10-08 NOTE — TELEPHONE ENCOUNTER
Cayla from [facility] called need prescription for botox medicine. They need it so patient can get her botox injection.  Please fax script to: [phone]

## 2024-10-15 NOTE — TELEPHONE ENCOUNTER
[facility] calling again requesting a new script for Botox to be sent to them. Pharm states this will be the first time patient will use their pharm for this medication

## 2024-10-16 ENCOUNTER — TELEPHONE (OUTPATIENT)
Age: 54
End: 2024-10-16

## 2024-11-05 ENCOUNTER — TELEPHONE (OUTPATIENT)
Dept: NEUROLOGY | Facility: CLINIC | Age: 54
End: 2024-11-05

## 2024-11-20 DIAGNOSIS — G43.709 CHRONIC MIGRAINE WITHOUT AURA, NOT INTRACTABLE, WITHOUT STATUS MIGRAINOSUS: ICD-10-CM

## 2024-11-22 ENCOUNTER — PROCEDURE VISIT (OUTPATIENT)
Dept: NEUROLOGY | Facility: CLINIC | Age: 54
End: 2024-11-22
Payer: COMMERCIAL

## 2024-11-22 VITALS — DIASTOLIC BLOOD PRESSURE: 68 MMHG | TEMPERATURE: 97.4 F | SYSTOLIC BLOOD PRESSURE: 114 MMHG

## 2024-11-22 DIAGNOSIS — G43.709 CHRONIC MIGRAINE WITHOUT AURA WITHOUT STATUS MIGRAINOSUS, NOT INTRACTABLE: Primary | ICD-10-CM

## 2024-11-22 PROCEDURE — 64615 CHEMODENERV MUSC MIGRAINE: CPT | Performed by: NURSE PRACTITIONER

## 2024-11-22 NOTE — PATIENT INSTRUCTIONS
Botox Therapy  Important Information    Our goal is to make sure you fully understand how Botox Therapy treatment may benefit you and to help you understand how you can play an active role in your treatments and ongoing care. Please review the following information below.    Call our office IMMEDIATELY @ [phone] and speak to one of our Botox Coordinators if you have a change in insurance. (a prior authorization is required and accurate information is vital)  Please call at least 24 hours in advance if you can't make your appointment.  Appointments are scheduled every 91 days (this will be scheduled in advance before leaving the office)  You must allow for at least 2-3 treatments to determine if Botox is right for you. It may take a few weeks to see a response from treatment.  No hair dye or scalp massage or treatment within 24 hours of treatment  We encourage you to use a headache diary or journal to document your headache frequency and severity. You can also utilize the Migraine Sunny palak (downloadable on most smart phones)  Sign up for the Botox Savings Program. (commercial insurance patients may qualify) Sign up at [employer] or call [phone] Option: 4  To get more information on Botox therapy for Chronic Migraines and see frequently asked questions, please visit [email or web address]  If you have any questions or concerns, please speak to one of our Botox Coordinators at [phone].    We look forward to servicing you!

## 2024-11-23 NOTE — PROGRESS NOTES
Universal Protocol   procedure performed by consultantConsent: Verbal consent obtained. Written consent obtained.  Risks and benefits: risks, benefits and alternatives were discussed  Consent given by: patient  Patient understanding: patient states understanding of the procedure being performed  Patient consent: the patient's understanding of the procedure matches consent given  Procedure consent: procedure consent matches procedure scheduled  Patient identity confirmed: verbally with patient      Chemodenervation     Date/Time  11/22/2024 3:30 PM     Performed by  ANCA Eastman   Authorized by  ANCA Eastman     Pre-procedure details      Prepped With: Alcohol     Anesthesia  (see MAR for exact dosages):     Anesthesia method:  None   Procedure details      Position:  Upright   Botox      Botox Type:  Type A    Brand:  Botox    mL's of Botulinum Toxin:  155 (200 units; 45 waste)    mL's of preservative free sterile saline:  4    Final Concentration per CC:  50 units    Needle gauge: 30g 0.5in.   Procedures      Botox Procedures: chronic headache      Indications: migraines      Date of last exam:  8/29/2024    Last date: first injection.   Injection Location      Head / Face:  L superior cervical paraspinal, R superior cervical paraspinal, L , R , L frontalis, R frontalis, L inferior occipitalis, R inferior occipitalis, L lateral occipitalis, R medial occipitalis, R lateral occipitalis, L medial occipitalis, procerus, L temporalis, R temporalis, L superior trapezius and R superior trapezius    L  injection amount:  5 unit(s)    R  injection amount:  5 unit(s)    L lateral frontalis:  5 unit(s)    R lateral frontalis:  5 unit(s)    L medial frontalis:  5 unit(s)    R medial frontalis:  5 unit(s)    L temporalis injection amount:  20 unit(s)    R temporalis injection amount:  20 unit(s)    Procerus injection amount:  5 unit(s)    L inferior occipitalis injection  amount:  5 unit(s)    R inferior occipitalis injection amount:  5 unit(s)    L lateral occipitalis injection amount:  5 unit(s)    R lateral occipitalis injection amount:  5 unit(s)    L medial occipitalis injection amount:  5 unit(s)    R medial occipitalis injection amount:  5 unit(s)    L superior cervical paraspinal injection amount:  10 unit(s)    R superior cervical paraspinal injection amount:  10 unit(s)    L superior trapezius injection amount:  15 unit(s)    R superior trapezius injection amount:  15 unit(s)   Total Units      Total units used:  155 (200 units; 45 waste)    Total units discarded:  45   Post-procedure details      Chemodenervation:  Chronic migraine    Facial Nerve Location::  Bilateral facial nerve    Patient tolerance of procedure:  Tolerated well, no immediate complications

## 2024-12-06 NOTE — TELEPHONE ENCOUNTER
Called patient  Received voicemail  Left detailed message (per consent in chart) regarding below  Requested patient return call to our office  No care due was identified.  [facility] Embedded Care Due Messages. Reference number: [id number].   12/06/2024 3:00:49 PM CST

## 2025-02-24 ENCOUNTER — TELEPHONE (OUTPATIENT)
Age: 55
End: 2025-02-24

## 2025-04-15 RX ORDER — ONABOTULINUMTOXINA 100 [USP'U]/1
INJECTION, POWDER, LYOPHILIZED, FOR SOLUTION INTRADERMAL; INTRAMUSCULAR
Qty: 1 EACH | Refills: 0 | OUTPATIENT
Start: 2025-04-15

## 2025-04-15 NOTE — TELEPHONE ENCOUNTER
Not sure if patient wants to come for repeat botox and unclear which pharmacy to send it to on review of previous messages